# Patient Record
Sex: MALE | Race: WHITE | NOT HISPANIC OR LATINO | Employment: STUDENT | URBAN - METROPOLITAN AREA
[De-identification: names, ages, dates, MRNs, and addresses within clinical notes are randomized per-mention and may not be internally consistent; named-entity substitution may affect disease eponyms.]

---

## 2020-10-27 ENCOUNTER — TELEPHONE (OUTPATIENT)
Dept: INTERNAL MEDICINE CLINIC | Facility: CLINIC | Age: 35
End: 2020-10-27

## 2020-10-27 ENCOUNTER — OFFICE VISIT (OUTPATIENT)
Dept: INTERNAL MEDICINE CLINIC | Facility: CLINIC | Age: 35
End: 2020-10-27
Payer: COMMERCIAL

## 2020-10-27 VITALS
DIASTOLIC BLOOD PRESSURE: 80 MMHG | HEART RATE: 83 BPM | HEIGHT: 71 IN | WEIGHT: 191 LBS | TEMPERATURE: 98.3 F | BODY MASS INDEX: 26.74 KG/M2 | SYSTOLIC BLOOD PRESSURE: 132 MMHG | OXYGEN SATURATION: 98 %

## 2020-10-27 DIAGNOSIS — Z98.890 HISTORY OF FASCIOTOMY: ICD-10-CM

## 2020-10-27 DIAGNOSIS — R39.11 BENIGN PROSTATIC HYPERPLASIA WITH URINARY HESITANCY: ICD-10-CM

## 2020-10-27 DIAGNOSIS — F90.0 ATTENTION DEFICIT HYPERACTIVITY DISORDER (ADHD), PREDOMINANTLY INATTENTIVE TYPE: Primary | ICD-10-CM

## 2020-10-27 DIAGNOSIS — N52.9 ERECTILE DYSFUNCTION, UNSPECIFIED ERECTILE DYSFUNCTION TYPE: ICD-10-CM

## 2020-10-27 DIAGNOSIS — M25.662 JOINT STIFFNESS OF LEFT LOWER LEG: ICD-10-CM

## 2020-10-27 DIAGNOSIS — Z13.220 LIPID SCREENING: ICD-10-CM

## 2020-10-27 DIAGNOSIS — N40.1 BENIGN PROSTATIC HYPERPLASIA WITH URINARY HESITANCY: ICD-10-CM

## 2020-10-27 DIAGNOSIS — F32.9 MAJOR DEPRESSIVE DISORDER, REMISSION STATUS UNSPECIFIED, UNSPECIFIED WHETHER RECURRENT: ICD-10-CM

## 2020-10-27 PROCEDURE — 3008F BODY MASS INDEX DOCD: CPT | Performed by: INTERNAL MEDICINE

## 2020-10-27 PROCEDURE — 3725F SCREEN DEPRESSION PERFORMED: CPT | Performed by: INTERNAL MEDICINE

## 2020-10-27 PROCEDURE — 99203 OFFICE O/P NEW LOW 30 MIN: CPT | Performed by: INTERNAL MEDICINE

## 2020-10-27 RX ORDER — TADALAFIL 5 MG/1
5 TABLET ORAL DAILY
Qty: 90 TABLET | Refills: 0 | Status: SHIPPED | OUTPATIENT
Start: 2020-10-27 | End: 2020-10-29 | Stop reason: SDUPTHER

## 2020-10-27 RX ORDER — DEXTROAMPHETAMINE SACCHARATE, AMPHETAMINE ASPARTATE, DEXTROAMPHETAMINE SULFATE AND AMPHETAMINE SULFATE 7.5; 7.5; 7.5; 7.5 MG/1; MG/1; MG/1; MG/1
30 TABLET ORAL DAILY
COMMUNITY
End: 2020-10-27 | Stop reason: SDUPTHER

## 2020-10-27 RX ORDER — FLUOXETINE HYDROCHLORIDE 40 MG/1
80 CAPSULE ORAL DAILY
Qty: 60 CAPSULE | Refills: 1 | Status: SHIPPED | OUTPATIENT
Start: 2020-10-27 | End: 2021-03-31 | Stop reason: SDUPTHER

## 2020-10-27 RX ORDER — TADALAFIL 5 MG/1
5 TABLET ORAL DAILY PRN
Qty: 90 TABLET | Refills: 0 | Status: SHIPPED | OUTPATIENT
Start: 2020-10-27 | End: 2020-10-27 | Stop reason: SDUPTHER

## 2020-10-27 RX ORDER — TADALAFIL 5 MG/1
5 TABLET ORAL DAILY PRN
COMMUNITY
End: 2020-10-27 | Stop reason: SDUPTHER

## 2020-10-27 RX ORDER — FLUOXETINE HYDROCHLORIDE 40 MG/1
40 CAPSULE ORAL DAILY
COMMUNITY
End: 2020-10-27 | Stop reason: SDUPTHER

## 2020-10-27 RX ORDER — DEXTROAMPHETAMINE SACCHARATE, AMPHETAMINE ASPARTATE, DEXTROAMPHETAMINE SULFATE AND AMPHETAMINE SULFATE 7.5; 7.5; 7.5; 7.5 MG/1; MG/1; MG/1; MG/1
30 TABLET ORAL DAILY
Qty: 30 TABLET | Refills: 0 | Status: SHIPPED | OUTPATIENT
Start: 2020-10-27 | End: 2020-12-01 | Stop reason: SDUPTHER

## 2020-10-29 RX ORDER — TADALAFIL 5 MG/1
5 TABLET ORAL DAILY
Qty: 90 TABLET | Refills: 0 | Status: SHIPPED | OUTPATIENT
Start: 2020-10-29 | End: 2020-11-09 | Stop reason: SDUPTHER

## 2020-11-03 ENCOUNTER — EVALUATION (OUTPATIENT)
Dept: PHYSICAL THERAPY | Facility: REHABILITATION | Age: 35
End: 2020-11-03
Payer: COMMERCIAL

## 2020-11-03 DIAGNOSIS — M25.662 JOINT STIFFNESS OF LEFT LOWER LEG: ICD-10-CM

## 2020-11-03 PROCEDURE — 97161 PT EVAL LOW COMPLEX 20 MIN: CPT | Performed by: PHYSICAL THERAPIST

## 2020-11-05 ENCOUNTER — TELEPHONE (OUTPATIENT)
Dept: INTERNAL MEDICINE CLINIC | Facility: CLINIC | Age: 35
End: 2020-11-05

## 2020-11-05 ENCOUNTER — TELEPHONE (OUTPATIENT)
Dept: PSYCHIATRY | Facility: CLINIC | Age: 35
End: 2020-11-05

## 2020-11-05 DIAGNOSIS — N40.1 BENIGN NON-NODULAR PROSTATIC HYPERPLASIA WITH LOWER URINARY TRACT SYMPTOMS: Primary | ICD-10-CM

## 2020-11-05 DIAGNOSIS — R39.11 BENIGN PROSTATIC HYPERPLASIA WITH URINARY HESITANCY: ICD-10-CM

## 2020-11-05 DIAGNOSIS — N40.1 BENIGN PROSTATIC HYPERPLASIA WITH URINARY HESITANCY: ICD-10-CM

## 2020-11-06 ENCOUNTER — TELEPHONE (OUTPATIENT)
Dept: PSYCHIATRY | Facility: CLINIC | Age: 35
End: 2020-11-06

## 2020-11-09 ENCOUNTER — EVALUATION (OUTPATIENT)
Dept: PHYSICAL THERAPY | Facility: OTHER | Age: 35
End: 2020-11-09
Payer: COMMERCIAL

## 2020-11-09 DIAGNOSIS — M25.662 JOINT STIFFNESS OF LEFT LOWER LEG: Primary | ICD-10-CM

## 2020-11-09 PROCEDURE — 97140 MANUAL THERAPY 1/> REGIONS: CPT | Performed by: PHYSICAL THERAPIST

## 2020-11-09 PROCEDURE — 97110 THERAPEUTIC EXERCISES: CPT | Performed by: PHYSICAL THERAPIST

## 2020-11-09 RX ORDER — TADALAFIL 5 MG/1
5 TABLET ORAL DAILY
Qty: 90 TABLET | Refills: 0 | Status: SHIPPED | OUTPATIENT
Start: 2020-11-09 | End: 2020-12-24 | Stop reason: SDUPTHER

## 2020-11-10 ENCOUNTER — TELEPHONE (OUTPATIENT)
Dept: INTERNAL MEDICINE CLINIC | Facility: CLINIC | Age: 35
End: 2020-11-10

## 2020-11-10 DIAGNOSIS — N40.1 BENIGN NON-NODULAR PROSTATIC HYPERPLASIA WITH LOWER URINARY TRACT SYMPTOMS: ICD-10-CM

## 2020-11-10 DIAGNOSIS — N40.1 BENIGN PROSTATIC HYPERPLASIA WITH URINARY HESITANCY: Primary | ICD-10-CM

## 2020-11-10 DIAGNOSIS — N52.9 ERECTILE DYSFUNCTION, UNSPECIFIED ERECTILE DYSFUNCTION TYPE: ICD-10-CM

## 2020-11-10 DIAGNOSIS — R39.11 BENIGN PROSTATIC HYPERPLASIA WITH URINARY HESITANCY: Primary | ICD-10-CM

## 2020-11-12 ENCOUNTER — OFFICE VISIT (OUTPATIENT)
Dept: PHYSICAL THERAPY | Facility: OTHER | Age: 35
End: 2020-11-12
Payer: COMMERCIAL

## 2020-11-12 DIAGNOSIS — M25.662 JOINT STIFFNESS OF LEFT LOWER LEG: Primary | ICD-10-CM

## 2020-11-12 PROCEDURE — 97140 MANUAL THERAPY 1/> REGIONS: CPT | Performed by: PHYSICAL THERAPIST

## 2020-11-12 PROCEDURE — 97112 NEUROMUSCULAR REEDUCATION: CPT | Performed by: PHYSICAL THERAPIST

## 2020-11-12 PROCEDURE — 97110 THERAPEUTIC EXERCISES: CPT | Performed by: PHYSICAL THERAPIST

## 2020-11-16 ENCOUNTER — APPOINTMENT (OUTPATIENT)
Dept: PHYSICAL THERAPY | Facility: OTHER | Age: 35
End: 2020-11-16
Payer: COMMERCIAL

## 2020-11-19 ENCOUNTER — APPOINTMENT (OUTPATIENT)
Dept: PHYSICAL THERAPY | Facility: OTHER | Age: 35
End: 2020-11-19
Payer: COMMERCIAL

## 2020-11-23 ENCOUNTER — APPOINTMENT (OUTPATIENT)
Dept: PHYSICAL THERAPY | Facility: OTHER | Age: 35
End: 2020-11-23
Payer: COMMERCIAL

## 2020-12-01 DIAGNOSIS — F90.0 ATTENTION DEFICIT HYPERACTIVITY DISORDER (ADHD), PREDOMINANTLY INATTENTIVE TYPE: ICD-10-CM

## 2020-12-01 RX ORDER — DEXTROAMPHETAMINE SACCHARATE, AMPHETAMINE ASPARTATE, DEXTROAMPHETAMINE SULFATE AND AMPHETAMINE SULFATE 7.5; 7.5; 7.5; 7.5 MG/1; MG/1; MG/1; MG/1
30 TABLET ORAL DAILY
Qty: 30 TABLET | Refills: 0 | Status: SHIPPED | OUTPATIENT
Start: 2020-12-01 | End: 2020-12-24 | Stop reason: SDUPTHER

## 2020-12-24 DIAGNOSIS — N40.1 BENIGN NON-NODULAR PROSTATIC HYPERPLASIA WITH LOWER URINARY TRACT SYMPTOMS: ICD-10-CM

## 2020-12-24 DIAGNOSIS — F90.0 ATTENTION DEFICIT HYPERACTIVITY DISORDER (ADHD), PREDOMINANTLY INATTENTIVE TYPE: ICD-10-CM

## 2020-12-29 DIAGNOSIS — F90.0 ATTENTION DEFICIT HYPERACTIVITY DISORDER (ADHD), PREDOMINANTLY INATTENTIVE TYPE: ICD-10-CM

## 2020-12-29 RX ORDER — DEXTROAMPHETAMINE SACCHARATE, AMPHETAMINE ASPARTATE, DEXTROAMPHETAMINE SULFATE AND AMPHETAMINE SULFATE 7.5; 7.5; 7.5; 7.5 MG/1; MG/1; MG/1; MG/1
30 TABLET ORAL DAILY
Qty: 30 TABLET | Refills: 0 | Status: SHIPPED | OUTPATIENT
Start: 2020-12-29 | End: 2021-01-19 | Stop reason: SDUPTHER

## 2020-12-29 RX ORDER — DEXTROAMPHETAMINE SACCHARATE, AMPHETAMINE ASPARTATE, DEXTROAMPHETAMINE SULFATE AND AMPHETAMINE SULFATE 7.5; 7.5; 7.5; 7.5 MG/1; MG/1; MG/1; MG/1
30 TABLET ORAL DAILY
Qty: 30 TABLET | Refills: 0 | Status: CANCELLED | OUTPATIENT
Start: 2020-12-29

## 2020-12-29 RX ORDER — TADALAFIL 5 MG/1
5 TABLET ORAL DAILY
Qty: 90 TABLET | Refills: 1 | Status: SHIPPED | OUTPATIENT
Start: 2020-12-29 | End: 2021-01-05 | Stop reason: SDUPTHER

## 2021-01-05 DIAGNOSIS — N40.1 BENIGN NON-NODULAR PROSTATIC HYPERPLASIA WITH LOWER URINARY TRACT SYMPTOMS: ICD-10-CM

## 2021-01-05 RX ORDER — TADALAFIL 5 MG/1
5 TABLET ORAL DAILY
Qty: 90 TABLET | Refills: 1 | Status: SHIPPED | OUTPATIENT
Start: 2021-01-05 | End: 2021-03-04 | Stop reason: SDUPTHER
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
## 2021-01-19 ENCOUNTER — OFFICE VISIT (OUTPATIENT)
Dept: PSYCHIATRY | Facility: CLINIC | Age: 36
End: 2021-01-19
Payer: COMMERCIAL

## 2021-01-19 ENCOUNTER — TELEPHONE (OUTPATIENT)
Dept: PSYCHIATRY | Facility: CLINIC | Age: 36
End: 2021-01-19

## 2021-01-19 VITALS — WEIGHT: 186.2 LBS | HEIGHT: 71 IN | BODY MASS INDEX: 26.07 KG/M2

## 2021-01-19 DIAGNOSIS — F90.0 ATTENTION DEFICIT HYPERACTIVITY DISORDER (ADHD), PREDOMINANTLY INATTENTIVE TYPE: Primary | ICD-10-CM

## 2021-01-19 DIAGNOSIS — F32.9 MAJOR DEPRESSIVE DISORDER, REMISSION STATUS UNSPECIFIED, UNSPECIFIED WHETHER RECURRENT: ICD-10-CM

## 2021-01-19 PROCEDURE — 90791 PSYCH DIAGNOSTIC EVALUATION: CPT | Performed by: STUDENT IN AN ORGANIZED HEALTH CARE EDUCATION/TRAINING PROGRAM

## 2021-01-19 RX ORDER — TRAZODONE HYDROCHLORIDE 50 MG/1
50 TABLET ORAL
Qty: 30 TABLET | Refills: 1 | Status: SHIPPED | OUTPATIENT
Start: 2021-01-19 | End: 2021-03-26

## 2021-01-19 RX ORDER — DEXTROAMPHETAMINE SACCHARATE, AMPHETAMINE ASPARTATE, DEXTROAMPHETAMINE SULFATE AND AMPHETAMINE SULFATE 7.5; 7.5; 7.5; 7.5 MG/1; MG/1; MG/1; MG/1
30 TABLET ORAL DAILY
Qty: 30 TABLET | Refills: 0 | Status: SHIPPED | OUTPATIENT
Start: 2021-01-19 | End: 2021-02-17 | Stop reason: SDUPTHER

## 2021-01-19 NOTE — TELEPHONE ENCOUNTER
Patient called wanting to l/m for you  The referral you gave him today [place] for [address] [place] [address] does not take his insurance  He is wondering if you could recommend somewhere else    Please call [phone]

## 2021-01-19 NOTE — BH TREATMENT PLAN
TREATMENT PLAN (Medication Management Only)        [place]    Name and Date of Birth: Prachi Wilcox 38 y o  1985  Date of Treatment Plan: January 19, 2021  Diagnosis/Diagnoses:    1  Attention deficit hyperactivity disorder (ADHD), predominantly inattentive type    2  Major depressive disorder, remission status unspecified, unspecified whether recurrent      Strengths/Personal Resources for Self-Care: "girl-friend"  Area/Areas of need (in own words): depression, ADHD symptoms  1  Long Term Goal: maintain control of ADHD symptoms  Target Date:4 months - 5/19/2021  Person/Persons responsible for completion of goal: Herbert  2  Short Term Objective (s) - How will we reach this goal?:   A  Provider new recommended medication/dosage changes and/or continue medication(s): continue current medications as prescribed  B  referred for therapy  C  N/A  Target Date:4 months - 5/19/2021  Person/Persons Responsible for Completion of Goal: Herbert  Progress Towards Goals: initiating treatment  Treatment Modality: medication management every 4 weeks  Review due 180 days from date of this plan: 4 months - 5/19/2021  Expected length of service: maintenance  My Physician/PA/NP and I have developed this plan together and I agree to work on the goals and objectives  I understand the treatment goals that were developed for my treatment
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
## 2021-02-17 ENCOUNTER — TELEMEDICINE (OUTPATIENT)
Dept: PSYCHIATRY | Facility: CLINIC | Age: 36
End: 2021-02-17
Payer: COMMERCIAL

## 2021-02-17 DIAGNOSIS — F32.9 MAJOR DEPRESSIVE DISORDER, REMISSION STATUS UNSPECIFIED, UNSPECIFIED WHETHER RECURRENT: ICD-10-CM

## 2021-02-17 DIAGNOSIS — F90.0 ATTENTION DEFICIT HYPERACTIVITY DISORDER (ADHD), PREDOMINANTLY INATTENTIVE TYPE: Primary | ICD-10-CM

## 2021-02-17 PROCEDURE — 99213 OFFICE O/P EST LOW 20 MIN: CPT | Performed by: STUDENT IN AN ORGANIZED HEALTH CARE EDUCATION/TRAINING PROGRAM

## 2021-02-17 RX ORDER — BUPROPION HYDROCHLORIDE 150 MG/1
150 TABLET ORAL DAILY
Qty: 30 TABLET | Refills: 1 | Status: SHIPPED | OUTPATIENT
Start: 2021-02-17 | End: 2021-03-31 | Stop reason: ALTCHOICE

## 2021-02-17 RX ORDER — DEXTROAMPHETAMINE SACCHARATE, AMPHETAMINE ASPARTATE, DEXTROAMPHETAMINE SULFATE AND AMPHETAMINE SULFATE 7.5; 7.5; 7.5; 7.5 MG/1; MG/1; MG/1; MG/1
30 TABLET ORAL DAILY
Qty: 30 TABLET | Refills: 0 | Status: SHIPPED | OUTPATIENT
Start: 2021-02-17 | End: 2021-03-23 | Stop reason: SDUPTHER

## 2021-03-01 ENCOUNTER — PATIENT MESSAGE (OUTPATIENT)
Dept: INTERNAL MEDICINE CLINIC | Facility: CLINIC | Age: 36
End: 2021-03-01

## 2021-03-01 DIAGNOSIS — N40.1 BENIGN NON-NODULAR PROSTATIC HYPERPLASIA WITH LOWER URINARY TRACT SYMPTOMS: ICD-10-CM

## 2021-03-04 RX ORDER — TADALAFIL 20 MG/1
20 TABLET ORAL DAILY PRN
Qty: 30 TABLET | Refills: 3 | Status: SHIPPED | OUTPATIENT
Start: 2021-03-04

## 2021-03-23 DIAGNOSIS — F90.0 ATTENTION DEFICIT HYPERACTIVITY DISORDER (ADHD), PREDOMINANTLY INATTENTIVE TYPE: ICD-10-CM

## 2021-03-23 RX ORDER — DEXTROAMPHETAMINE SACCHARATE, AMPHETAMINE ASPARTATE, DEXTROAMPHETAMINE SULFATE AND AMPHETAMINE SULFATE 7.5; 7.5; 7.5; 7.5 MG/1; MG/1; MG/1; MG/1
30 TABLET ORAL DAILY
Qty: 30 TABLET | Refills: 0 | Status: SHIPPED | OUTPATIENT
Start: 2021-03-23 | End: 2021-03-26 | Stop reason: SDUPTHER

## 2021-03-24 ENCOUNTER — OFFICE VISIT (OUTPATIENT)
Dept: INTERNAL MEDICINE CLINIC | Facility: CLINIC | Age: 36
End: 2021-03-24
Payer: COMMERCIAL

## 2021-03-24 ENCOUNTER — APPOINTMENT (OUTPATIENT)
Dept: RADIOLOGY | Age: 36
End: 2021-03-24
Payer: COMMERCIAL

## 2021-03-24 VITALS
HEIGHT: 71 IN | SYSTOLIC BLOOD PRESSURE: 138 MMHG | WEIGHT: 191.2 LBS | OXYGEN SATURATION: 97 % | TEMPERATURE: 99.3 F | DIASTOLIC BLOOD PRESSURE: 78 MMHG | BODY MASS INDEX: 26.77 KG/M2 | HEART RATE: 85 BPM

## 2021-03-24 DIAGNOSIS — M54.2 CERVICAL PAIN (NECK): ICD-10-CM

## 2021-03-24 DIAGNOSIS — M54.2 CERVICAL PAIN (NECK): Primary | ICD-10-CM

## 2021-03-24 PROCEDURE — 3008F BODY MASS INDEX DOCD: CPT | Performed by: INTERNAL MEDICINE

## 2021-03-24 PROCEDURE — 99214 OFFICE O/P EST MOD 30 MIN: CPT | Performed by: INTERNAL MEDICINE

## 2021-03-24 PROCEDURE — 72050 X-RAY EXAM NECK SPINE 4/5VWS: CPT

## 2021-03-24 PROCEDURE — 1036F TOBACCO NON-USER: CPT | Performed by: INTERNAL MEDICINE

## 2021-03-24 RX ORDER — METHOCARBAMOL 500 MG/1
500 TABLET, FILM COATED ORAL
Qty: 30 TABLET | Refills: 0 | Status: SHIPPED | OUTPATIENT
Start: 2021-03-24 | End: 2021-04-23 | Stop reason: SDUPTHER
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
## 2021-03-24 NOTE — ASSESSMENT & PLAN NOTE
Will prescribe methocarbamol 500 mg to be taken at bedtime for muscle spasms  Continue Tylenol/NSAIDs as needed for pain  Will follow-up an x-ray of the cervical spine and refer to Physical therapy for further evaluation

## 2021-03-26 DIAGNOSIS — F32.9 MAJOR DEPRESSIVE DISORDER, REMISSION STATUS UNSPECIFIED, UNSPECIFIED WHETHER RECURRENT: ICD-10-CM

## 2021-03-26 RX ORDER — TRAZODONE HYDROCHLORIDE 50 MG/1
TABLET ORAL
Qty: 30 TABLET | Refills: 1 | Status: SHIPPED | OUTPATIENT
Start: 2021-03-26 | End: 2021-03-31 | Stop reason: SDUPTHER

## 2021-03-26 RX ORDER — DEXTROAMPHETAMINE SACCHARATE, AMPHETAMINE ASPARTATE, DEXTROAMPHETAMINE SULFATE AND AMPHETAMINE SULFATE 7.5; 7.5; 7.5; 7.5 MG/1; MG/1; MG/1; MG/1
30 TABLET ORAL DAILY
Qty: 30 TABLET | Refills: 0 | Status: SHIPPED | OUTPATIENT
Start: 2021-03-26 | End: 2021-03-31 | Stop reason: DRUGHIGH

## 2021-03-28 NOTE — PSYCH
Virtual Regular Visit    Assessment/Plan:    Problem List Items Addressed This Visit        Other    Attention deficit hyperactivity disorder (ADHD), predominantly inattentive type - Primary    Relevant Medications    FLUoxetine (PROzac) 40 MG capsule    lisdexamfetamine (VYVANSE) 60 MG capsule (Start on 4/26/2021)    amphetamine-dextroamphetamine (ADDERALL) 20 mg tablet (Start on 4/26/2021)    traZODone (DESYREL) 50 mg tablet    Major depressive disorder    Relevant Medications    FLUoxetine (PROzac) 40 MG capsule    lisdexamfetamine (VYVANSE) 60 MG capsule (Start on 4/26/2021)    amphetamine-dextroamphetamine (ADDERALL) 20 mg tablet (Start on 4/26/2021)    traZODone (DESYREL) 50 mg tablet               Reason for visit is   Chief Complaint   Patient presents with    Medication Management    ADHD    Depression        Encounter provider Thai Gama MD    Provider located at: [facility] 14  [facility] 22  [facility] 3    Recent Visits  Date Type Provider Dept   03/24/21 Office Visit Venkat De La O MD [facility] recent visits within past 7 days and meeting all other requirements     Today's Visits  Date Type Provider Dept   03/31/21 Telemedicine Thai Gama MD [facility] 18 today's visits and meeting all other requirements     Future Appointments  No visits were found meeting these conditions  Showing future appointments within next 150 days and meeting all other requirements        The patient was identified by name and date of birth  Mini Bearden was informed that this is a telemedicine visit and that the visit is being conducted through [employer] and patient was informed that this is a secure, HIPAA-compliant platform  He agrees to proceed  My office door was closed  No one else was in the room    He acknowledged consent and understanding of privacy and security of the video platform  The patient has agreed to participate and understands they can discontinue the visit at any time  Patient is aware this is a billable service  MEDICATION MANAGEMENT NOTE        [facility]      Name and Date of Birth: Autumn Burt 43 y o  1985 MRN: [id number]    Date of Visit: March 31, 2021    Reason for Visit:   Chief Complaint   Patient presents with    Medication Management    ADHD    Depression       SUBJECTIVE:  The patient was visited virtually for medication management and follow up visit for depression and ADHD  Presented calm, and cooperative  Reported feeling "ok"  He stated that 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 [employer] in [facility], and will move to [facility] in [place] first  He c/o neck pain and Xray showed some DJD changes  Scored his pain up to 8/10, triggered mostly by moving around  Denied any changes in sleep, appetite, concentration, energy level, or daily activities  Denied feelings of anhedonia, hopelessness, helplessness, worthlessness or guilt and appeared to be future oriented  There was no thought constriction related to death  Denied SI/HI, intent or plan upon direct inquiry at this time  Denied AV/H  No anxiety sxs, specific phobia or panic attacks reported  No manic sxs, paranoid ideations or fixed delusions were elicited  Endorsed good compliance with the medications  Continues to report sexual side effects w/ Prozac, and noted that adding Wellbutrin was not helpful  Denied any other side effects  Denied smoking cigarettes, binge drinking alcohol or other illicit substance use  Given this presentation, Wellbutrin discontinues, Prozac and Vyvanse maintained at the same dose and Adderall decreased from 30 mg to 20 mg to be tapered off as tolerated  Pending therapy  Pending blood work as per PCP   The patient was educated to call 911 or go to the nearest emergency room if the symptoms become overwhelming or unable to remain in control  Verbalized understanding and agreed to seek help in case of distress or concern for safety  Review Of Systems:  Pertinent items are noted in HPI; all others are negative; no recent changes in medications or health status reported  PHQ-9 Depression Screening    PHQ-9:   Frequency of the following problems over the past two weeks:      Little interest or pleasure in doing things: 1 - several days  Feeling down, depressed, or hopeless: 1 - several days  Trouble falling or staying asleep, or sleeping too much: 1 - several days  Feeling tired or having little energy: 1 - several days  Poor appetite or overeatin - several days  Feeling bad about yourself - or that you are a failure or have let yourself or your family down: 1 - several days  Trouble concentrating on things, such as reading the newspaper or watching television: 1 - several days  Moving or speaking so slowly that other people could have noticed  Or the opposite - being so fidgety or restless that you have been moving around a lot more than usual: 0 - not at all  Thoughts that you would be better off dead, or of hurting yourself in some way: 0 - not at all  PHQ-2 Score: 2  PHQ-9 Score: 7         Past Psychiatric History Update:   - No inpatient psychiatric admission since last encounter  - No SA or SIB since last encounter  - No incidence of violent behavior since last encounter    Past Trauma History Update:    - No new onset of abuse or traumatic events since last encounter     Past Medical History:    Past Medical History:   Diagnosis Date    Benign non-nodular prostatic hyperplasia with lower urinary tract symptoms      No past medical history pertinent negatives    Past Surgical History:   Procedure Laterality Date    WISDOM TOOTH EXTRACTION       No Known Allergies    Substance Abuse History:    Social History     Substance and Sexual Activity   Alcohol Use Yes    Frequency: 2-3 times a week     Social History Substance and Sexual Activity   Drug Use Never       Social History:    Social History     Socioeconomic History    Marital status: Single     Spouse name: Not on file    Number of children: Not on file    Years of education: Not on file    Highest education level: Not on file   Occupational History    Not on file   Social Needs    Financial resource strain: Not on file    Food insecurity     Worry: Not on file     Inability: Not on file    Transportation needs     Medical: Not on file     Non-medical: Not on file   Tobacco Use    Smoking status: Never Smoker    Smokeless tobacco: Never Used   Substance and Sexual Activity    Alcohol use: Yes     Frequency: 2-3 times a week    Drug use: Never    Sexual activity: Yes   Lifestyle    Physical activity     Days per week: Not on file     Minutes per session: Not on file    Stress: Not on file   Relationships    Social connections     Talks on phone: Not on file     Gets together: Not on file     Attends Alevism service: Not on file     Active member of club or organization: Not on file     Attends meetings of clubs or organizations: Not on file     Relationship status: Not on file    Intimate partner violence     Fear of current or ex partner: Not on file     Emotionally abused: Not on file     Physically abused: Not on file     Forced sexual activity: Not on file   Other Topics Concern    Not on file   Social History Narrative    Not on file       Family Psychiatric History:     Family History   Problem Relation Age of Onset    No Known Problems Mother     No Known Problems Father        History Review:  The following portions of the patient's history were reviewed and updated as appropriate: allergies, current medications, past family history, past medical history, past social history, past surgical history and problem list        OBJECTIVE:     Vital signs in last 24 hours:    Vitals:       Mental Status Evaluation:  Appearance and attitude: appeared as stated age, cooperative and attentive, casually dressed with good hygiene, wearing eye-glasses  Eye contact: good  Motor Function: within normal limits, No PMA/PMR  Gait/station: Not observed  Speech: normal for rate, rhythm, volume, latency, amount  Language: No overt abnormality  Mood/affect: euthymic / Affect was constricted but reactive, mood congruent  Thought Processes: sequential and goal-directed  Thought content: denies suicidal ideation or homicidal ideation; no delusions or first rank symptoms  Associations: intact associations  Perceptual disturbances: denies Auditory/Visual/Tactile Hallucinations  Orientation: oriented to time, person, place and to the situational context  Cognitive Function: intact  Memory: intact short-term and long-term  Intellect: average  Fund of knowledge: aware of current events, aware of past history and vocabulary average  Impulse control: good  Insight/judgment: good/good    Pain: reported having pain in neck  Pain scale: up to 8/10    Laboratory Results: I have personally reviewed all pertinent laboratory/tests results    Recent Labs (last 2 months):   No visits with results within 2 Month(s) from this visit  Latest known visit with results is:   No results found for any previous visit  Assessment/Plan:   Juancho Aguilar y o   male,  (7 y/o daughter in joint custody and 26 y/o son on [place] living w/ his mother), domiciled alone and 10 days/month w/ her daughter, 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 [employer] PMH of BPH, erectile dysfunction, h/o fasciotomy, joint stiffness of LLE and PPH of ADHD and MDD, no prior psychiatric admissions, no prior SA, no h/o self-injurious behavior, on Prozac 80 mg, Vyvanse 60 mg and Adderall 30 mg who presented to the mental health clinic for the initial intake and psychiatric evaluation on 1/19/2021   Presented w/ ADHD sxs well controlled with current regimen over past few years, and h/o MDD in remission  Denied SI/HI, intent or plan upon direct inquiry at this time  PHQ-9: 4; SUKH-7: 4  His current presentation meets criteria for ADHD and MDD, in remission  Maintained on the same medication regimen, and started on Trazodone PRN for sleep  Referred for individual therapy  The patient presented w/ stable mood and ADHD sxs, and c/o sexual side effects w/ Prozac (was started on Wellbutrin  mg po daily to counteract sexual side effects whic reportedly was not helpful and discontinued), Vyvanse maintained the same dose and Adderall tapered down from 30 mg to 20 mg  Pending blood work and therapy  Diagnoses and all orders for this visit:    Attention deficit hyperactivity disorder (ADHD), predominantly inattentive type  -     FLUoxetine (PROzac) 40 MG capsule; Take 2 capsules (80 mg total) by mouth daily  -     lisdexamfetamine (VYVANSE) 60 MG capsule; Take 1 capsule (60 mg total) by mouth every morningMax Daily Amount: 60 mg  -     amphetamine-dextroamphetamine (ADDERALL) 20 mg tablet; Take 1 tablet (20 mg total) by mouth dailyMax Daily Amount: 20 mg    Major depressive disorder, remission status unspecified, unspecified whether recurrent  -     FLUoxetine (PROzac) 40 MG capsule; Take 2 capsules (80 mg total) by mouth daily  -     traZODone (DESYREL) 50 mg tablet; Take 1 tablet (50 mg total) by mouth daily at bedtime          Impression:  1  Attention deficit hyperactivity disorder (ADHD), predominantly inattentive type  FLUoxetine (PROzac) 40 MG capsule    lisdexamfetamine (VYVANSE) 60 MG capsule    amphetamine-dextroamphetamine (ADDERALL) 20 mg tablet   2   Major depressive disorder, remission status unspecified, unspecified whether recurrent  FLUoxetine (PROzac) 40 MG capsule    traZODone (DESYREL) 50 mg tablet       Treatment Recommendations/Precautions:  - f/u labs as per PCP  - Continue Vyvanse 60 mg for ADHD  - Decrease Adderall 30 mg to 20 mg po daily in the afternoon for ADHD; to be tapered off as tolerated  - Continue Prozac 80 mg for depression  - Continue Trazodone 50 mg po nightly for insomnia  - Stop Wellbutrin  - Medications sent to patient's pharmacy for 30 day supply  - Pending therapy  - Psychoeducation provided to the patient and benefits, potential risks and side effects discussed; importance of compliance with the psychiatric treatment reiterated, and the patient verbalized understanding of the matter     - RTC in 8 weeks  - Educated about healthy life style, risk of falls/sedation and addiction  Patient was receptive to education   - The patient was educated about 24 hour and weekend coverage for urgent situations accessed by calling [facility] practice number  - Patient was educated to call [address] [facility] ([phone] [[id number]]) for behavioral crisis at anytime or 911 for any safety concerns, or go to nearest ER if his symptoms become overwhelming or unmanageable  Current Outpatient Medications   Medication Sig Dispense Refill    [START ON 4/26/2021] amphetamine-dextroamphetamine (ADDERALL) 20 mg tablet Take 1 tablet (20 mg total) by mouth dailyMax Daily Amount: 20 mg 30 tablet 0    FLUoxetine (PROzac) 40 MG capsule Take 2 capsules (80 mg total) by mouth daily 60 capsule 1    [START ON 4/26/2021] lisdexamfetamine (VYVANSE) 60 MG capsule Take 1 capsule (60 mg total) by mouth every morningMax Daily Amount: 60 mg 30 capsule 0    methocarbamol (ROBAXIN) 500 mg tablet Take 1 tablet (500 mg total) by mouth daily at bedtime as needed for muscle spasms 30 tablet 0    tadalafil (CIALIS) 20 MG tablet Take 1 tablet (20 mg total) by mouth daily as needed for erectile dysfunction 30 tablet 3    traZODone (DESYREL) 50 mg tablet Take 1 tablet (50 mg total) by mouth daily at bedtime 30 tablet 1     No current facility-administered medications for this visit            Medications Risks/Benefits      Risks, Benefits And Possible Side Effects Of Medications:    Risks, benefits, and possible side effects of medications explained to Herbert and he verbalizes understanding and agreement for treatment  Controlled Medication Discussion:     Tomasz Milan has been filling controlled prescriptions on time as prescribed according to [address] Monitoring Program    Psychotherapy Provided:     Individual psychotherapy provided: Yes  Counseling was provided during the session today for 16 minutes  Psychoeducation provided to the patient and was educated about the importance of compliance with the medications and psychiatric treatment  Supportive psychotherapy provided to the patient  Solution Focused Brief Therapy (SFBT) provided  Patient's emotions were validated and specific labeled praise provided  [place] suggestions were offered in a supportive non-critical way       Treatment Plan:    Completed and signed during the session: Not applicable - Treatment Plan not due at this session    Hiren Martin MD 03/31/21

## 2021-03-31 ENCOUNTER — TELEMEDICINE (OUTPATIENT)
Dept: PSYCHIATRY | Facility: CLINIC | Age: 36
End: 2021-03-31
Payer: COMMERCIAL

## 2021-03-31 DIAGNOSIS — F90.0 ATTENTION DEFICIT HYPERACTIVITY DISORDER (ADHD), PREDOMINANTLY INATTENTIVE TYPE: Primary | ICD-10-CM

## 2021-03-31 DIAGNOSIS — F32.9 MAJOR DEPRESSIVE DISORDER, REMISSION STATUS UNSPECIFIED, UNSPECIFIED WHETHER RECURRENT: ICD-10-CM

## 2021-03-31 PROCEDURE — 99213 OFFICE O/P EST LOW 20 MIN: CPT | Performed by: STUDENT IN AN ORGANIZED HEALTH CARE EDUCATION/TRAINING PROGRAM

## 2021-03-31 RX ORDER — DEXTROAMPHETAMINE SACCHARATE, AMPHETAMINE ASPARTATE, DEXTROAMPHETAMINE SULFATE AND AMPHETAMINE SULFATE 5; 5; 5; 5 MG/1; MG/1; MG/1; MG/1
20 TABLET ORAL DAILY
Qty: 30 TABLET | Refills: 0 | Status: SHIPPED | OUTPATIENT
Start: 2021-04-26 | End: 2021-05-25 | Stop reason: SDUPTHER

## 2021-03-31 RX ORDER — TRAZODONE HYDROCHLORIDE 50 MG/1
50 TABLET ORAL
Qty: 30 TABLET | Refills: 1 | Status: SHIPPED | OUTPATIENT
Start: 2021-03-31 | End: 2021-05-25 | Stop reason: SDUPTHER

## 2021-03-31 RX ORDER — FLUOXETINE HYDROCHLORIDE 40 MG/1
80 CAPSULE ORAL DAILY
Qty: 60 CAPSULE | Refills: 1 | Status: SHIPPED | OUTPATIENT
Start: 2021-03-31 | End: 2021-06-18

## 2021-04-01 ENCOUNTER — TELEPHONE (OUTPATIENT)
Dept: INTERNAL MEDICINE CLINIC | Facility: CLINIC | Age: 36
End: 2021-04-01

## 2021-04-01 NOTE — TELEPHONE ENCOUNTER
Herbert called this afternoon and wanted to get a work note excusing him from work for Tuesday 3/30-Thursday 4/1 due to neck pain  He was seen in the office on 03/24/21 for this issue by Dr Manuel Go  He stated that it got worse this past week  Can a note be written for him?

## 2021-04-01 NOTE — LETTER
April 1, 2021     Patient: Diane Flor   YOB: 1985   Date of Visit: 4/1/2021       To Whom it May Concern: Diane Flro is under my professional care  Please excuse him from work for the dates missed from 03/30 until 04/01/2021  If you have any questions or concerns, please don't hesitate to call           Sincerely,        Patty Jacobo MD

## 2021-04-06 ENCOUNTER — APPOINTMENT (EMERGENCY)
Dept: RADIOLOGY | Facility: HOSPITAL | Age: 36
End: 2021-04-06
Payer: COMMERCIAL

## 2021-04-06 ENCOUNTER — TELEPHONE (OUTPATIENT)
Dept: RADIOLOGY | Facility: HOSPITAL | Age: 36
End: 2021-04-06

## 2021-04-06 ENCOUNTER — HOSPITAL ENCOUNTER (EMERGENCY)
Facility: HOSPITAL | Age: 36
Discharge: HOME | End: 2021-04-06
Attending: EMERGENCY MEDICINE | Admitting: EMERGENCY MEDICINE
Payer: COMMERCIAL

## 2021-04-06 VITALS
TEMPERATURE: 98 F | SYSTOLIC BLOOD PRESSURE: 132 MMHG | DIASTOLIC BLOOD PRESSURE: 71 MMHG | BODY MASS INDEX: 26.6 KG/M2 | WEIGHT: 190 LBS | HEIGHT: 71 IN | RESPIRATION RATE: 18 BRPM | HEART RATE: 70 BPM | OXYGEN SATURATION: 98 %

## 2021-04-06 DIAGNOSIS — S22.000A THORACIC COMPRESSION FRACTURE (HCC): Primary | ICD-10-CM

## 2021-04-06 LAB
BACTERIA UR QL AUTO: NORMAL /HPF
BILIRUB UR QL STRIP: NEGATIVE
CLARITY UR: CLEAR
CLARITY, POC: CLEAR
COLOR UR: YELLOW
COLOR, POC: YELLOW
GLUCOSE UR STRIP-MCNC: NEGATIVE MG/DL
HGB UR QL STRIP.AUTO: ABNORMAL
HYALINE CASTS #/AREA URNS LPF: NORMAL /LPF
KETONES UR STRIP-MCNC: NEGATIVE MG/DL
LEUKOCYTE ESTERASE UR QL STRIP: NEGATIVE
NITRITE UR QL STRIP: NEGATIVE
NON-SQ EPI CELLS URNS QL MICRO: NORMAL /HPF
PH UR STRIP.AUTO: 7 [PH] (ref 4.5–8)
PROT UR STRIP-MCNC: NEGATIVE MG/DL
RBC #/AREA URNS AUTO: NORMAL /HPF
SP GR UR STRIP.AUTO: 1.02 (ref 1–1.03)
UROBILINOGEN UR QL STRIP.AUTO: 0.2 E.U./DL
WBC #/AREA URNS AUTO: NORMAL /HPF

## 2021-04-06 PROCEDURE — 99284 EMERGENCY DEPT VISIT MOD MDM: CPT | Performed by: EMERGENCY MEDICINE

## 2021-04-06 PROCEDURE — 81001 URINALYSIS AUTO W/SCOPE: CPT

## 2021-04-06 PROCEDURE — G1004 CDSM NDSC: HCPCS

## 2021-04-06 PROCEDURE — 87591 N.GONORRHOEAE DNA AMP PROB: CPT | Performed by: EMERGENCY MEDICINE

## 2021-04-06 PROCEDURE — 99284 EMERGENCY DEPT VISIT MOD MDM: CPT

## 2021-04-06 PROCEDURE — 87491 CHLMYD TRACH DNA AMP PROBE: CPT | Performed by: EMERGENCY MEDICINE

## 2021-04-06 PROCEDURE — 96372 THER/PROPH/DIAG INJ SC/IM: CPT

## 2021-04-06 PROCEDURE — 72141 MRI NECK SPINE W/O DYE: CPT

## 2021-04-06 RX ORDER — KETOROLAC TROMETHAMINE 30 MG/ML
15 INJECTION, SOLUTION INTRAMUSCULAR; INTRAVENOUS ONCE
Status: COMPLETED | OUTPATIENT
Start: 2021-04-06 | End: 2021-04-06

## 2021-04-06 RX ADMIN — KETOROLAC TROMETHAMINE 15 MG: 30 INJECTION, SOLUTION INTRAMUSCULAR at 11:18

## 2021-04-06 NOTE — ED PROVIDER NOTES
History  Chief Complaint   Patient presents with    Neck Pain     reports neck pain x1 month, had xrays that shows narrowing at C5-6, reprots worsening pain and new tingling and weakness in b/l arms starting last night with new urinary incontinence x1 week  reports injury 7 years ago and another injury 1 year ago  HPI  42 yo male with PMH depression, ADHD, presents to the ED with concern for progressively worsening neck pain x 3 months  Pain is worse with turning his head from side to side  Reports he has also been having intermittent tingling and subjective weakness in his bilateral upper extremities  Does not have this currently, but did have this last night  Pt also concerned that over the last week he has had a few episodes of urinary urgency where he has difficulty getting to the bathroom in time, and also urinary hesitancy where he has difficulty emptying his bladder  Denies saddle anesthesia, overflow incontinence, bowel incontinence, lower extremity weakness/numbness  No fevers, chills, nausea, vomiting, chest pain, SOB, headache, hx IVDU  No recent trauma or heavy lifting  States snowboarding incident and heavy lifting a few years ago after which he had neck pain but nothing recent  Pt was evaluated by his PCP for this neck pain on 3/24 and had XR cervical spine that showed some mild foraminal narrowing at C4-C5  He has an appointment scheduled with physical therapy for next week  Prior to Admission Medications   Prescriptions Last Dose Informant Patient Reported? Taking?    FLUoxetine (PROzac) 40 MG capsule   No Yes   Sig: Take 2 capsules (80 mg total) by mouth daily   amphetamine-dextroamphetamine (ADDERALL) 20 mg tablet   No Yes   Sig: Take 1 tablet (20 mg total) by mouth dailyMax Daily Amount: 20 mg   lisdexamfetamine (VYVANSE) 60 MG capsule   No Yes   Sig: Take 1 capsule (60 mg total) by mouth every morningMax Daily Amount: 60 mg   methocarbamol (ROBAXIN) 500 mg tablet   No Yes   Sig: Take 1 tablet (500 mg total) by mouth daily at bedtime as needed for muscle spasms   tadalafil (CIALIS) 20 MG tablet   No Yes   Sig: Take 1 tablet (20 mg total) by mouth daily as needed for erectile dysfunction   traZODone (DESYREL) 50 mg tablet   No Yes   Sig: Take 1 tablet (50 mg total) by mouth daily at bedtime      Facility-Administered Medications: None       Past Medical History:   Diagnosis Date    Benign non-nodular prostatic hyperplasia with lower urinary tract symptoms        Past Surgical History:   Procedure Laterality Date    WISDOM TOOTH EXTRACTION         Family History   Problem Relation Age of Onset    No Known Problems Mother     No Known Problems Father      I have reviewed and agree with the history as documented  E-Cigarette/Vaping    E-Cigarette Use Never User      E-Cigarette/Vaping Substances     Social History     Tobacco Use    Smoking status: Never Smoker    Smokeless tobacco: Never Used   Substance Use Topics    Alcohol use: Yes     Frequency: 2-3 times a week    Drug use: Never        Review of Systems   Constitutional: Negative for chills and fever  HENT: Negative for congestion, rhinorrhea and sore throat  Respiratory: Negative for cough and shortness of breath  Cardiovascular: Negative for chest pain and palpitations  Gastrointestinal: Negative for abdominal pain, nausea and vomiting  Genitourinary: Positive for difficulty urinating  Negative for dysuria, hematuria and urgency  Musculoskeletal: Positive for arthralgias, back pain, myalgias and neck pain  Negative for gait problem  Skin: Negative for rash  Neurological: Positive for weakness and numbness  Negative for dizziness, light-headedness and headaches  All other systems reviewed and are negative        Physical Exam  ED Triage Vitals   Temperature Pulse Respirations Blood Pressure SpO2   04/06/21 1100 04/06/21 1050 04/06/21 1050 04/06/21 1050 04/06/21 1050   98 °F (36 7 °C) 66 18 132/76 98 %      Temp Source Heart Rate Source Patient Position - Orthostatic VS BP Location FiO2 (%)   04/06/21 1100 04/06/21 1245 04/06/21 1245 04/06/21 1245 --   Oral Monitor Lying Left arm       Pain Score       04/06/21 1050       7             Orthostatic Vital Signs  Vitals:    04/06/21 1050 04/06/21 1245 04/06/21 1430   BP: 132/76 130/73 132/71   Pulse: 66 68 70   Patient Position - Orthostatic VS:  Lying Lying       Physical Exam  Constitutional:       General: He is not in acute distress  Appearance: He is well-developed  He is not diaphoretic  HENT:      Head: Normocephalic and atraumatic  Right Ear: External ear normal       Left Ear: External ear normal    Eyes:      Conjunctiva/sclera: Conjunctivae normal       Pupils: Pupils are equal, round, and reactive to light  Neck:      Musculoskeletal: Normal range of motion and neck supple  Normal range of motion  Spinous process tenderness present  No neck rigidity, crepitus or muscular tenderness  Trachea: Trachea normal      Cardiovascular:      Rate and Rhythm: Normal rate and regular rhythm  Heart sounds: Normal heart sounds  No murmur  No friction rub  No gallop  Pulmonary:      Effort: Pulmonary effort is normal  No respiratory distress  Breath sounds: Normal breath sounds  No wheezing, rhonchi or rales  Abdominal:      General: Bowel sounds are normal  There is no distension  Palpations: Abdomen is soft  Tenderness: There is no abdominal tenderness  Musculoskeletal: Normal range of motion  Cervical back: He exhibits bony tenderness  Thoracic back: He exhibits no bony tenderness  Lumbar back: He exhibits no bony tenderness  Back:    Lymphadenopathy:      Cervical: No cervical adenopathy  Skin:     General: Skin is warm and dry  Neurological:      Mental Status: He is alert and oriented to person, place, and time  Cranial Nerves: Cranial nerves are intact  No cranial nerve deficit        Sensory: Sensation is intact  No sensory deficit  Motor: Motor function is intact  No weakness (5/5 bilateral upper and lower extremities on exam, but pt subjectively feels weak in upper extremities)  ED Medications  Medications   ketorolac (TORADOL) injection 15 mg (15 mg Intramuscular Given 4/6/21 1118)       Diagnostic Studies  Results Reviewed     Procedure Component Value Units Date/Time    Urine Microscopic [[id number]]  (Normal) Collected: 04/06/21 1130    Lab Status: Final result Specimen: Urine, Clean Catch Updated: 04/06/21 1235     RBC, UA 2-4 /hpf      WBC, UA None Seen /hpf      Epithelial Cells None Seen /hpf      Bacteria, UA None Seen /hpf      Hyaline Casts, UA None Seen /lpf     Chlamydia/GC amplified DNA by PCR [[id number]] Collected: 04/06/21 1132    Lab Status: In process Specimen: Urine, Other Updated: 04/06/21 1145    POCT urinalysis dipstick [[id number]]  (Normal) Resulted: 04/06/21 1131    Lab Status: Final result Updated: 04/06/21 1133     Color, UA yellow     Clarity, UA clear    Urine Macroscopic, POC [[id number]]  (Abnormal) Collected: 04/06/21 1130    Lab Status: Final result Specimen: Urine Updated: 04/06/21 1131     Color, UA Yellow     Clarity, UA Clear     pH, UA 7 0     Leukocytes, UA Negative     Nitrite, UA Negative     Protein, UA Negative mg/dl      Glucose, UA Negative mg/dl      Ketones, UA Negative mg/dl      Urobilinogen, UA 0 2 E U /dl      Bilirubin, UA Negative     Blood, UA Trace     Specific [place], UA 1 025    Narrative:      CLINITEK RESULT                 MRI cervical spine wo contrast   Final Result by Candido Rao DO (04/06 1432)      Chronic compression deformities are seen within the T1 and T2 vertebral bodies  No resulting canal stenosis or foraminal narrowing  There is mild left uncinate joint hypertrophic degenerative change at the C3-4 level extending into the neural foramen resulting in mild foraminal narrowing without clear nerve compression  The canal is widely patent with no cord compression  The slight anterior subluxation of C4 upon C5 seen on recent x-ray is not evident on this examination, likely reduced by supine positioning  Workstation performed: [id number]               Procedures  Procedures      ED Course  ED Course as of Apr 06 1536   Tue Apr 06, 2021   1454 Chronic compression deformities are seen within the T1 and T2 vertebral bodies  No resulting canal stenosis or foraminal narrowing      There is mild left uncinate joint hypertrophic degenerative change at the C3-4 level extending into the neural foramen resulting in mild foraminal narrowing without clear nerve compression      The canal is widely patent with no cord compression      The slight anterior subluxation of C4 upon C5 seen on recent x-ray is not evident on this examination, likely reduced by supine positioning  MRI cervical spine wo contrast   1503 Discussed results with patient  He                                 SBIRT 22yo+      Most Recent Value   SBIRT (24 yo +)   In order to provide better care to our patients, we are screening all of our patients for alcohol and drug use  Would it be okay to ask you these screening questions? No Filed at: 04/06/2021 1100                MDM  44 yo male with PMH depression, ADHD, presenting with concern for worsening neck pain with subjective upper extremity numbness/weakness and urinary urgency/hesitency  Will obtain UA, GC/Chlamydia to evaluate for UTI/STI  Given pt's significant concern will obtain MRI to evaluate for cord compression  Will treat with toradol  If no acute abnormality seen likely discharge with comprehensive spine follow up and strict return precautions     Disposition  Final diagnoses:   Thoracic compression fracture ([address] ) - Chronic, T1, T2     Time reflects when diagnosis was documented in both MDM as applicable and the Disposition within this note     Time User Action Codes Description Comment 4/6/2021  2:58 PM Miranda Pettit Add [Z94 [id number]] Thoracic compression fracture ([facility] [address] )     4/6/2021  2:58 PM Miranda Pettit Modify [E33 [id number]] Thoracic compression fracture ([facility] [address] ) Chronic, T1, T2      ED Disposition     ED Disposition Condition Date/Time Comment    Discharge Stable Tue Apr 6, 2021  2:55 PM Malina Alves discharge to home/self care  Follow-up Information     Follow up With Specialties Details Why Contact Info Additional Information    Marcela Gibson MD Internal Medicine Schedule an appointment as soon as possible for a visit   [address] [facility] [address]       [address] [facility] Schedule an appointment as soon as possible for a visit   [phone]          Discharge Medication List as of 4/6/2021  3:00 PM      CONTINUE these medications which have NOT CHANGED    Details   tadalafil (CIALIS) 20 MG tablet Take 1 tablet (20 mg total) by mouth daily as needed for erectile dysfunction, Starting u 3/4/2021, Normal      amphetamine-dextroamphetamine (ADDERALL) 20 mg tablet Take 1 tablet (20 mg total) by mouth dailyMax Daily Amount: 20 mg, Starting Mon 4/26/2021, Until Wed 5/26/2021, Normal      FLUoxetine (PROzac) 40 MG capsule Take 2 capsules (80 mg total) by mouth daily, Starting Wed 3/31/2021, Normal      lisdexamfetamine (VYVANSE) 60 MG capsule Take 1 capsule (60 mg total) by mouth every morningMax Daily Amount: 60 mg, Starting Mon 4/26/2021, Until Wed 5/26/2021, Normal      methocarbamol (ROBAXIN) 500 mg tablet Take 1 tablet (500 mg total) by mouth daily at bedtime as needed for muscle spasms, Starting Wed 3/24/2021, Normal      traZODone (DESYREL) 50 mg tablet Take 1 tablet (50 mg total) by mouth daily at bedtime, Starting Wed 3/31/2021, Until Sun 5/30/2021, Normal           No discharge procedures on file      PDMP Review       Value Time User    PDMP Reviewed  Yes 3/31/2021  8:26 AM Lety Valdez MD ED Provider  Attending physically available and evaluated Shaan Nicci GREGORY managed the patient along with the ED Attending      Electronically Signed by         Savanna Camejo MD  04/06/21 9899

## 2021-04-06 NOTE — Clinical Note
Gildardo Martinez was seen and treated in our emergency department on 4/6/2021  Diagnosis: Radha Grey  may return to work on return date  He may return on this date: 04/08/2021         If you have any questions or concerns, please don't hesitate to call        Betsy Celaya MD    ______________________________           _______________          _______________  Hospital Representative                              Date                                Time

## 2021-04-06 NOTE — DISCHARGE INSTRUCTIONS
Return to the emergency department for persistent weakness or numbness in your arms or legs, loss of bowel or bladder control, numbness in your groin region, fever, inability to walk, any other new or concerning symptoms

## 2021-04-06 NOTE — ED ATTENDING ATTESTATION
4/6/2021  IOlive MD, saw and evaluated the patient  I have discussed the patient with the resident/non-physician practitioner and agree with the resident's/non-physician practitioner's findings, Plan of Care, and MDM as documented in the resident's/non-physician practitioner's note, except where noted  All available labs and Radiology studies were reviewed  I was present for key portions of any procedure(s) performed by the resident/non-physician practitioner and I was immediately available to provide assistance  At this point I agree with the current assessment done in the Emergency Department  I have conducted an independent evaluation of this patient a history and physical is as follows:    ED Course    25-year-old male presents with worsening of his chronic cervical neck pain bilateral upper extremity numbness and weakness onset symptoms occured last night  He denies difficulty walking  Possible urinary incontinence  Denies saddle anesthesia    Denies fevers chills  Recent outpatient x-ray narrowing of C4-5 5 6  Patient reports a remote history of trauma with neck injury secondary to snowboarding accident no recent trauma  Vitals reviewed    Patient has mild lower cervical upper thoracic spinal tenderness to palpation    Strength is 5/5 bilaterally in upper  Lower extremity strength is at baseline (patient has 4/5 strength in left lower extremity secondary to prior lower extremity vascular injury requiring fasciotomy) sensation grossly intact    Impression neck pain reticular symptoms will check MRI of cervical spine exclude acute cord compression    Pain control anticipate discharge with follow-up with comprehensive Spine           Critical Care Time  Procedures

## 2021-04-07 LAB
C TRACH DNA SPEC QL NAA+PROBE: NEGATIVE
N GONORRHOEA DNA SPEC QL NAA+PROBE: NEGATIVE

## 2021-04-14 ENCOUNTER — TELEMEDICINE (OUTPATIENT)
Dept: INTERNAL MEDICINE CLINIC | Facility: CLINIC | Age: 36
End: 2021-04-14
Payer: COMMERCIAL

## 2021-04-14 VITALS — BODY MASS INDEX: 25.06 KG/M2 | HEIGHT: 72 IN | WEIGHT: 185 LBS

## 2021-04-14 DIAGNOSIS — B34.9 VIRAL INFECTION, UNSPECIFIED: Primary | ICD-10-CM

## 2021-04-14 PROCEDURE — 99213 OFFICE O/P EST LOW 20 MIN: CPT | Performed by: INTERNAL MEDICINE

## 2021-04-14 PROCEDURE — 3008F BODY MASS INDEX DOCD: CPT | Performed by: INTERNAL MEDICINE

## 2021-04-14 PROCEDURE — 1036F TOBACCO NON-USER: CPT | Performed by: INTERNAL MEDICINE

## 2021-04-14 NOTE — PROGRESS NOTES
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  Disposition:     After clarifying the patient's history, my suspicion for COVID-19 infection is very low  I have spent 15 minutes directly with the patient  Greater than 50% of this time was spent in counseling/coordination of care regarding: diagnostic results, prognosis, risks and benefits of treatment options, instructions for management, patient and family education, importance of treatment compliance, risk factor reductions and impressions  Encounter provider Sherin Llanos MD    Provider located at [address] [place] [id number]    Recent Visits  No visits were found meeting these conditions  Showing recent visits within past 7 days and meeting all other requirements     Today's Visits  Date Type Provider Dept   04/14/21 Telemedicine Sherin Llanos MD [facility]   Showing today's visits and meeting all other requirements     Future Appointments  No visits were found meeting these conditions  Showing future appointments within next 150 days and meeting all other requirements      This virtual check-in was done via [facility] and patient was informed that this is a secure, HIPAA-compliant platform  He agrees to proceed  Patient agrees to participate in a virtual check in via telephone or video visit instead of presenting to the office to address urgent/immediate medical needs  Patient is aware this is a billable service      After connecting through [facility], the patient was identified by name and date of birth  Gabe Osborne was informed that this was a telemedicine visit and that the exam was being conducted confidentially over secure lines  My office door was closed  No one else was in the room  Gabe Osborne acknowledged consent and understanding of privacy and security of the telemedicine visit  I informed the patient that I have reviewed his record in Epic and presented the opportunity for him to ask any questions regarding the visit today  The patient agreed to participate  Subjective: Gabe Osborne is a 43 y o  male who is concerned about COVID-19  Patient's symptoms include malaise, nasal congestion and cough  Patient denies fever, chills, fatigue, rhinorrhea, sore throat, anosmia, loss of taste, shortness of breath, chest tightness, abdominal pain, nausea, vomiting, diarrhea, myalgias and headaches  Date of symptom onset: 4/12/2021  Date of exposure: 4/15/2021    Exposure:   Contact with a person who is under investigation (PUI) for or who is positive for COVID-19 within the last 14 days?: No    Hospitalized recently for fever and/or lower respiratory symptoms?: No      Currently a healthcare worker that is involved in direct patient care?: No      Works in a special setting where the risk of COVID-19 transmission may be high? (this may include long-term care, correctional and senior care facilities; homeless shelters; assisted-living facilities and group homes ): No      Resident in a special setting where the risk of COVID-19 transmission may be high? (this may include long-term care, correctional and senior care facilities; homeless shelters; assisted-living facilities and group homes ): No      57-year-old male seen today after experiencing viral URI symptoms  His daughter to which he was last in contact with on 04/12/2021 also had viral URI symptoms as did his ex/ her mother    He was sent for COVID testing yesterday to which PCR is negative  His symptoms are overall improving  He has received both COVID vaccines, [employer], back in December 2020  Lab Results   Component Value Date    [address] Not Detected 04/12/2021     Past Medical History:   Diagnosis Date    Benign non-nodular prostatic hyperplasia with lower urinary tract symptoms      Past Surgical History:   Procedure Laterality Date    WISDOM TOOTH EXTRACTION       Current Outpatient Medications   Medication Sig Dispense Refill    [START ON 4/26/2021] amphetamine-dextroamphetamine (ADDERALL) 20 mg tablet Take 1 tablet (20 mg total) by mouth dailyMax Daily Amount: 20 mg 30 tablet 0    FLUoxetine (PROzac) 40 MG capsule Take 2 capsules (80 mg total) by mouth daily 60 capsule 1    [START ON 4/26/2021] lisdexamfetamine (VYVANSE) 60 MG capsule Take 1 capsule (60 mg total) by mouth every morningMax Daily Amount: 60 mg 30 capsule 0    methocarbamol (ROBAXIN) 500 mg tablet Take 1 tablet (500 mg total) by mouth daily at bedtime as needed for muscle spasms 30 tablet 0    tadalafil (CIALIS) 20 MG tablet Take 1 tablet (20 mg total) by mouth daily as needed for erectile dysfunction 30 tablet 3    traZODone (DESYREL) 50 mg tablet Take 1 tablet (50 mg total) by mouth daily at bedtime 30 tablet 1     No current facility-administered medications for this visit  No Known Allergies    Review of Systems   Constitutional: Negative for activity change, appetite change, chills, diaphoresis, fatigue and fever  HENT: Positive for congestion  Negative for postnasal drip, rhinorrhea, sinus pressure, sinus pain, sneezing and sore throat  Eyes: Negative for visual disturbance  Respiratory: Positive for cough  Negative for apnea, choking, chest tightness, shortness of breath and wheezing  Cardiovascular: Negative for chest pain, palpitations and leg swelling     Gastrointestinal: Negative for abdominal distention, abdominal pain, anal bleeding, blood in stool, constipation, diarrhea, nausea and vomiting  Endocrine: Negative for cold intolerance and heat intolerance  Genitourinary: Negative for difficulty urinating, dysuria and hematuria  Musculoskeletal: Negative  Negative for myalgias  Skin: Negative  Neurological: Negative for dizziness, weakness, light-headedness, numbness and headaches  Hematological: Negative for adenopathy  Psychiatric/Behavioral: Negative for agitation, sleep disturbance and suicidal ideas  All other systems reviewed and are negative  Objective:    Vitals:    04/14/21 0938   Weight: 83 9 kg (185 lb)   Height: 5' 11 5" (1 816 m)       Physical Exam  Vitals signs and nursing note reviewed  Constitutional:       General: He is not in acute distress  Appearance: Normal appearance  He is not ill-appearing  HENT:      Head: Normocephalic and atraumatic  Nose: Nose normal       Mouth/Throat:      Mouth: Mucous membranes are moist       Pharynx: No oropharyngeal exudate or posterior oropharyngeal erythema  Eyes:      General: No scleral icterus  Extraocular Movements: Extraocular movements intact  Conjunctiva/sclera: Conjunctivae normal       Pupils: Pupils are equal, round, and reactive to light  Neck:      Musculoskeletal: Normal range of motion  Pulmonary:      Effort: Pulmonary effort is normal  No respiratory distress  Breath sounds: No wheezing  Abdominal:      General: Abdomen is flat  There is no distension  Tenderness: There is no abdominal tenderness  Musculoskeletal:         General: No swelling, deformity or signs of injury  Skin:     General: Skin is warm and dry  Coloration: Skin is not jaundiced or pale  Findings: No bruising, erythema or rash  Neurological:      General: No focal deficit present  Mental Status: He is alert and oriented to person, place, and time  Mental status is at baseline  Cranial Nerves: No cranial nerve deficit     Psychiatric:         Mood and Affect: Mood normal          Behavior: Behavior normal          Thought Content: Thought content normal          Judgment: Judgment normal        VIRTUAL VISIT DISCLAIMER    [facility] acknowledges that he has consented to an online visit or consultation  He understands that the online visit is based solely on information provided by him, and that, in the absence of a face-to-face physical evaluation by the physician, the diagnosis he receives is both limited and provisional in terms of accuracy and completeness  This is not intended to replace a full medical face-to-face evaluation by the physician  [facility] understands and accepts these terms

## 2021-04-14 NOTE — ASSESSMENT & PLAN NOTE
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

## 2021-04-14 NOTE — LETTER
April 14, 2021     Patient: Jessica Roth   YOB: 1985   Date of Visit: 4/14/2021       To Whom it May Concern: Jessica Roth is under my professional care  He was seen in my office on 4/14/2021  He may return to school on 4/19/2021  If you have any questions or concerns, please don't hesitate to call           Sincerely,          Dany Moyer MD

## 2021-04-22 DIAGNOSIS — F32.9 MAJOR DEPRESSIVE DISORDER, REMISSION STATUS UNSPECIFIED, UNSPECIFIED WHETHER RECURRENT: ICD-10-CM

## 2021-04-22 DIAGNOSIS — F90.0 ATTENTION DEFICIT HYPERACTIVITY DISORDER (ADHD), PREDOMINANTLY INATTENTIVE TYPE: ICD-10-CM

## 2021-04-22 RX ORDER — BUPROPION HYDROCHLORIDE 150 MG/1
TABLET ORAL
Qty: 30 TABLET | Refills: 1 | OUTPATIENT
Start: 2021-04-22

## 2021-04-23 DIAGNOSIS — M54.2 CERVICAL PAIN (NECK): ICD-10-CM

## 2021-04-26 RX ORDER — METHOCARBAMOL 500 MG/1
500 TABLET, FILM COATED ORAL
Qty: 30 TABLET | Refills: 0 | Status: SHIPPED | OUTPATIENT
Start: 2021-04-26

## 2021-04-28 ENCOUNTER — PATIENT MESSAGE (OUTPATIENT)
Dept: INTERNAL MEDICINE CLINIC | Facility: CLINIC | Age: 36
End: 2021-04-28

## 2021-04-28 ENCOUNTER — EVALUATION (OUTPATIENT)
Dept: PHYSICAL THERAPY | Facility: REHABILITATION | Age: 36
End: 2021-04-28
Payer: COMMERCIAL

## 2021-04-28 DIAGNOSIS — M54.2 CERVICAL PAIN (NECK): Primary | ICD-10-CM

## 2021-04-28 DIAGNOSIS — M54.2 CERVICAL PAIN (NECK): ICD-10-CM

## 2021-04-28 PROCEDURE — 97162 PT EVAL MOD COMPLEX 30 MIN: CPT | Performed by: PHYSICAL THERAPIST

## 2021-04-28 NOTE — PROGRESS NOTES
PT Evaluation     Today's date: 2021  Patient name: Tamie Booth  : 1985  MRN: [id number]  Referring provider: Lul Mckeon MD  Dx:   Encounter Diagnosis     ICD-10-CM    1  Cervical pain (neck)  M54 2 Ambulatory referral to Physical Therapy                  Assessment  Assessment details: Tamie Booth is a pleasant 43 y o  male who presents with chronic upper thoracic spine pain  The patient's greatest concerns are the pain @HIS@ is experiencing, worry over not knowing what's wrong, concern at no signs of improvement, fear of not being able to keep active and future ill health (and wanting to prevent it)  No further referral appears necessary at this time based upon examination results  Primary movement impairment diagnosis of cervical spine hypomobility, postural control deficit, thoracic spine hypomobility resulting in pathoanatomical symptoms of chronic neck pain which is limiting his ability to exercise or recreation, go to work and turn to look over shoulder  Physical exam does not reproduce any radicular symptoms  Cervical myelopathy testing is negative  These symptoms will be monitored in future tx if they return  Patient's presentation is consistent with neck pain with postural control deficit treatment classification  Pt  will benefit from skilled PT services that includes manual therapy techniques to enhance tissue extensibility, neuromuscular re-education to facilitate motor control, therapeutic exercise to increase functional mobility, and modalities prn to reduce pain and inflammation        Primary Impairments:  1) cervical spine hypomobility  2) postural control deficit  3) thoracic spine hypomobility       Impairments: abnormal coordination, abnormal muscle firing, abnormal muscle tone, abnormal or restricted ROM, abnormal movement, activity intolerance, difficulty understanding, impaired physical strength, lacks appropriate home exercise program, pain with function, poor posture and poor body mechanics    Symptom irritability: moderateUnderstanding of Dx/Px/POC: good   Prognosis: good    Goals  STG  Patient will be independent with home exercise program in 1-2 visits  Patient will tolerate 1 hour of continuous walking without increase in symptoms in 3 weeks  Patient will report 3/10 pain at worst in 3 weeks  Patient will demonstrate 10s DNF endurance test in 3 weeks  LTG  Patient will be independent in comprehensive HEP upon discharge  Patient will report no pain with treadmill walking upon discharge  Patient will return to PLOF exercise routine upon discharge  Patient will demonstrate 25s DNF endurance test upon discharge  Plan  Patient would benefit from: skilled physical therapy  Planned therapy interventions: activity modification, joint mobilization, manual therapy, motor coordination training, neuromuscular re-education, patient education, self care, therapeutic activities, therapeutic exercise, graded activity, home exercise program, behavior modification, graded exercise, functional ROM exercises, strengthening and postural training  Frequency: 2x week  Duration in weeks: 8  Treatment plan discussed with: patient        Subjective Evaluation    History of Present Illness  Mechanism of injury: Patient reports a 5-6 month history of upper back pain  He had been walking at home with a 20 pound weight vest on a treadmill and had to stop because of the pain  Patient started to experience progressive neurologic symptoms of B/L LE pain and B/L UE weakness  He had an MRI performed demonstrating no spinal cord compression  He began taking NSAIDs which has improved his symptoms  He is concerned because 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 and 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 perform patient transfers and heavy lifting  Patient reports no longer having any radicular symptoms  Patient denies 5 D's/3 N's      Pain  Current pain ratin  At best pain ratin  At worst pain ratin  Location: Central upper thoracic  Quality: throbbing and sharp  Progression: improved    Treatments  Previous treatment: medication  Patient Goals  Patient goals for therapy: decreased pain, improved balance, increased motion, increased strength, return to sport/leisure activities and return to work          Objective     General Comments:      Cervical/Thoracic Comments  Gait:  WNL     Standing Posture: thoracic kyphotic deformity @ T1; forward head posture     Cervical Spine ROM:   Flexion: WNL; hinging @ CTJ w/ mid cervical extension  Extension: WNL; hinging @ lower cervical  SB: R   45 deg  L 45 deg   Rotation: R 80 deg     L 80 deg  Retraction: 75% lmitation     Thoracic Spine ROM:   Flexion: WNL  Extension: 75% limited    Scapular retraction: poor periscapular activation; upper trap compensation    Palpation: TTP CPA T1/T2; TTP R UPA T1    Special Tests:   Modified VBI: Neg B/L  Sharp Purcer: neg B/L  Alar Ligament: neg B/L  Compression: neg  Distraction: neg  Spurling A: neg  DNF endurance: unable to perform without compensation    Upper Quarter Screen:   Dermatomes: WNL B/L  Myotomes:  WNL B/L  Reflexes: 2+ biceps/brachioradilais B/L  Ortiz: neg    Upper Quarter Strength: WNL B/L                 Precautions: ED visit for neuro symptoms 21      Manuals                                        Neuro Re-Ed        Cervical retraction 20x HEP       B/L scap retraction 20x otb HEP                                               Ther Ex        UBE/TM walk        Seated thoracic extension        Supine thoracic extension                                                 Ther Activity                        Gait Training                        Modalities

## 2021-05-03 ENCOUNTER — PATIENT MESSAGE (OUTPATIENT)
Dept: INTERNAL MEDICINE CLINIC | Facility: CLINIC | Age: 36
End: 2021-05-03

## 2021-05-03 DIAGNOSIS — F32.9 MAJOR DEPRESSIVE DISORDER, REMISSION STATUS UNSPECIFIED, UNSPECIFIED WHETHER RECURRENT: ICD-10-CM

## 2021-05-03 DIAGNOSIS — F90.0 ATTENTION DEFICIT HYPERACTIVITY DISORDER (ADHD), PREDOMINANTLY INATTENTIVE TYPE: ICD-10-CM

## 2021-05-03 DIAGNOSIS — Z11.3 SCREEN FOR STD (SEXUALLY TRANSMITTED DISEASE): Primary | ICD-10-CM

## 2021-05-06 ENCOUNTER — APPOINTMENT (OUTPATIENT)
Dept: LAB | Age: 36
End: 2021-05-06
Payer: COMMERCIAL

## 2021-05-06 DIAGNOSIS — N52.9 ERECTILE DYSFUNCTION, UNSPECIFIED ERECTILE DYSFUNCTION TYPE: ICD-10-CM

## 2021-05-06 DIAGNOSIS — N40.1 BENIGN PROSTATIC HYPERPLASIA WITH URINARY HESITANCY: ICD-10-CM

## 2021-05-06 DIAGNOSIS — F90.0 ATTENTION DEFICIT HYPERACTIVITY DISORDER (ADHD), PREDOMINANTLY INATTENTIVE TYPE: ICD-10-CM

## 2021-05-06 DIAGNOSIS — F32.9 MAJOR DEPRESSIVE DISORDER, REMISSION STATUS UNSPECIFIED, UNSPECIFIED WHETHER RECURRENT: ICD-10-CM

## 2021-05-06 DIAGNOSIS — Z11.3 SCREEN FOR STD (SEXUALLY TRANSMITTED DISEASE): ICD-10-CM

## 2021-05-06 DIAGNOSIS — Z13.220 LIPID SCREENING: ICD-10-CM

## 2021-05-06 DIAGNOSIS — R39.11 BENIGN PROSTATIC HYPERPLASIA WITH URINARY HESITANCY: ICD-10-CM

## 2021-05-06 LAB
ALBUMIN SERPL BCP-MCNC: 4 G/DL (ref 3.5–5)
ALP SERPL-CCNC: 56 U/L (ref 46–116)
ALT SERPL W P-5'-P-CCNC: 26 U/L (ref 12–78)
ANION GAP SERPL CALCULATED.3IONS-SCNC: 3 MMOL/L (ref 4–13)
AST SERPL W P-5'-P-CCNC: 15 U/L (ref 5–45)
BILIRUB SERPL-MCNC: 0.62 MG/DL (ref 0.2–1)
BUN SERPL-MCNC: 18 MG/DL (ref 5–25)
CALCIUM SERPL-MCNC: 9 MG/DL (ref 8.3–10.1)
CHLORIDE SERPL-SCNC: 107 MMOL/L (ref 100–108)
CHOLEST SERPL-MCNC: 169 MG/DL (ref 50–200)
CO2 SERPL-SCNC: 30 MMOL/L (ref 21–32)
CREAT SERPL-MCNC: 0.94 MG/DL (ref 0.6–1.3)
ERYTHROCYTE [DISTWIDTH] IN BLOOD BY AUTOMATED COUNT: 11.9 % (ref 11.6–15.1)
GFR SERPL CREATININE-BSD FRML MDRD: 102 ML/MIN/1.73SQ M
GLUCOSE P FAST SERPL-MCNC: 93 MG/DL (ref 65–99)
HBV CORE AB SER QL: NORMAL
HBV CORE IGM SER QL: NORMAL
HBV SURFACE AG SER QL: NORMAL
HCT VFR BLD AUTO: 45.1 % (ref 36.5–49.3)
HCV AB SER QL: NORMAL
HDLC SERPL-MCNC: 46 MG/DL
HGB BLD-MCNC: 15.3 G/DL (ref 12–17)
LDLC SERPL CALC-MCNC: 102 MG/DL (ref 0–100)
MCH RBC QN AUTO: 31.2 PG (ref 26.8–34.3)
MCHC RBC AUTO-ENTMCNC: 33.9 G/DL (ref 31.4–37.4)
MCV RBC AUTO: 92 FL (ref 82–98)
NONHDLC SERPL-MCNC: 123 MG/DL
PLATELET # BLD AUTO: 192 THOUSANDS/UL (ref 149–390)
PMV BLD AUTO: 10.8 FL (ref 8.9–12.7)
POTASSIUM SERPL-SCNC: 4.1 MMOL/L (ref 3.5–5.3)
PROT SERPL-MCNC: 6.7 G/DL (ref 6.4–8.2)
PSA SERPL-MCNC: 2.8 NG/ML (ref 0–4)
RBC # BLD AUTO: 4.91 MILLION/UL (ref 3.88–5.62)
RPR SER QL: NORMAL
SODIUM SERPL-SCNC: 140 MMOL/L (ref 136–145)
TRIGL SERPL-MCNC: 103 MG/DL
TSH SERPL DL<=0.05 MIU/L-ACNC: 0.51 UIU/ML (ref 0.36–3.74)
WBC # BLD AUTO: 5.42 THOUSAND/UL (ref 4.31–10.16)

## 2021-05-06 PROCEDURE — 80061 LIPID PANEL: CPT

## 2021-05-06 PROCEDURE — 87389 HIV-1 AG W/HIV-1&-2 AB AG IA: CPT

## 2021-05-06 PROCEDURE — 86592 SYPHILIS TEST NON-TREP QUAL: CPT

## 2021-05-06 PROCEDURE — 36415 COLL VENOUS BLD VENIPUNCTURE: CPT

## 2021-05-06 PROCEDURE — 87591 N.GONORRHOEAE DNA AMP PROB: CPT

## 2021-05-06 PROCEDURE — G0103 PSA SCREENING: HCPCS

## 2021-05-06 PROCEDURE — 87491 CHLMYD TRACH DNA AMP PROBE: CPT

## 2021-05-06 PROCEDURE — 86704 HEP B CORE ANTIBODY TOTAL: CPT

## 2021-05-06 PROCEDURE — 85027 COMPLETE CBC AUTOMATED: CPT

## 2021-05-06 PROCEDURE — 87340 HEPATITIS B SURFACE AG IA: CPT

## 2021-05-06 PROCEDURE — 80053 COMPREHEN METABOLIC PANEL: CPT

## 2021-05-06 PROCEDURE — 84443 ASSAY THYROID STIM HORMONE: CPT

## 2021-05-06 PROCEDURE — 86705 HEP B CORE ANTIBODY IGM: CPT

## 2021-05-06 PROCEDURE — 86803 HEPATITIS C AB TEST: CPT

## 2021-05-07 LAB
C TRACH DNA SPEC QL NAA+PROBE: NEGATIVE
HIV 1+2 AB+HIV1 P24 AG SERPL QL IA: NORMAL
N GONORRHOEA DNA SPEC QL NAA+PROBE: NEGATIVE

## 2021-05-11 ENCOUNTER — TELEPHONE (OUTPATIENT)
Dept: PSYCHIATRY | Facility: CLINIC | Age: 36
End: 2021-05-11

## 2021-05-19 ENCOUNTER — OFFICE VISIT (OUTPATIENT)
Dept: OBGYN CLINIC | Facility: CLINIC | Age: 36
End: 2021-05-19
Payer: COMMERCIAL

## 2021-05-19 VITALS
SYSTOLIC BLOOD PRESSURE: 124 MMHG | BODY MASS INDEX: 25.9 KG/M2 | HEART RATE: 76 BPM | HEIGHT: 71 IN | WEIGHT: 185 LBS | DIASTOLIC BLOOD PRESSURE: 73 MMHG

## 2021-05-19 DIAGNOSIS — M54.2 CERVICALGIA: ICD-10-CM

## 2021-05-19 PROCEDURE — 1036F TOBACCO NON-USER: CPT | Performed by: ORTHOPAEDIC SURGERY

## 2021-05-19 PROCEDURE — 99203 OFFICE O/P NEW LOW 30 MIN: CPT | Performed by: ORTHOPAEDIC SURGERY

## 2021-05-19 PROCEDURE — 3008F BODY MASS INDEX DOCD: CPT | Performed by: ORTHOPAEDIC SURGERY

## 2021-05-19 NOTE — PROGRESS NOTES
Assessment:   Diagnosis ICD-10-CM Associated Orders   1  Cervicalgia  M54 2 Ambulatory referral to Orthopedic Surgery       Plan:    MRI and xray reviewed with patient no cord compression, no stenosis, mild anterior subluxation of C4/5  Protrusion of lower segment of cervical spine is over spinous processess and likely inflammatory in nature  5/5 strength bilateral, sensation intact, neurologically stable  He does have positive xie's bilateral, hypoactive reflexes at C5/6/7 bilateral  Continue with physical therapy  PRN  To do next visit:  Return if symptoms worsen or fail to improve  The above stated was discussed in layman's terms and the patient expressed understanding  All questions were answered to the patient's satisfaction  Scribe Attestation    I,:  Enmanuel Mckenzie am acting as a scribe while in the presence of the attending physician :       I,:  Jonny Fenton MD personally performed the services described in this documentation    as scribed in my presence :             Subjective: Yvonne Odom is a 43 y o  male who presents for evaluation of cervical neck pain  Pain is axial in nature lower segment, patient notes protrusion lower segment cervical  Ongoing for months with no injury or trauma  Pain was so bad a few weeks ago he went to ED  He reports no pain radiating into his arms, denies numbness, tingling, weakness  No reported balance issues  He has used robaxin, OTC anti inflammatories for pain  He has started 2 sessions of physical therapy  Review of systems negative unless otherwise specified in HPI  Review of Systems   Constitutional: Negative for chills and fever  HENT: Negative for ear pain and sore throat  Eyes: Negative for pain and visual disturbance  Respiratory: Negative for cough and shortness of breath  Cardiovascular: Negative for chest pain and palpitations  Gastrointestinal: Negative for abdominal pain and vomiting     Genitourinary: Negative for dysuria and hematuria  Musculoskeletal: Negative for arthralgias and back pain  Skin: Negative for color change and rash  Neurological: Negative for seizures and syncope  All other systems reviewed and are negative        Past Medical History:   Diagnosis Date    Benign non-nodular prostatic hyperplasia with lower urinary tract symptoms        Past Surgical History:   Procedure Laterality Date    WISDOM TOOTH EXTRACTION         Family History   Problem Relation Age of Onset    No Known Problems Mother     No Known Problems Father        Social History     Occupational History    Not on file   Tobacco Use    Smoking status: Never Smoker    Smokeless tobacco: Never Used   Substance and Sexual Activity    Alcohol use: Yes     Frequency: 2-3 times a week    Drug use: Never    Sexual activity: Yes         Current Outpatient Medications:     amphetamine-dextroamphetamine (ADDERALL) 20 mg tablet, Take 1 tablet (20 mg total) by mouth dailyMax Daily Amount: 20 mg, Disp: 30 tablet, Rfl: 0    FLUoxetine (PROzac) 40 MG capsule, Take 2 capsules (80 mg total) by mouth daily, Disp: 60 capsule, Rfl: 1    lisdexamfetamine (VYVANSE) 60 MG capsule, Take 1 capsule (60 mg total) by mouth every morningMax Daily Amount: 60 mg, Disp: 30 capsule, Rfl: 0    methocarbamol (ROBAXIN) 500 mg tablet, Take 1 tablet (500 mg total) by mouth daily at bedtime as needed for muscle spasms, Disp: 30 tablet, Rfl: 0    tadalafil (CIALIS) 20 MG tablet, Take 1 tablet (20 mg total) by mouth daily as needed for erectile dysfunction, Disp: 30 tablet, Rfl: 3    traZODone (DESYREL) 50 mg tablet, Take 1 tablet (50 mg total) by mouth daily at bedtime, Disp: 30 tablet, Rfl: 1    No Known Allergies         Vitals:    05/19/21 1458   BP: 124/73   Pulse: 76       Objective:                    Back Exam     Comments:  Cervical spine   No acute distress, skin intact  No specific tenderness to palpation cervical   C2-T1 5/5 strength bilateral C2-T1 sensation intact and symmetric   Positive xie bilateral   C5/6/7 bilateral hypoactive             Diagnostics, reviewed and taken today if performed as documented: The attending physician has personally reviewed the pertinent films in PACS and interpretation is as follows: MRI cervical spine chronic compression deformities at T1 and T2, no cord compression, no significant stenosis, slight anterior subluxation of C4 and C5       Procedures, if performed today:    Procedures    None performed      Portions of the record may have been created with voice recognition software  Occasional wrong word or "sound a like" substitutions may have occurred due to the inherent limitations of voice recognition software  Read the chart carefully and recognize, using context, where substitutions have occurred

## 2021-05-21 NOTE — PROGRESS NOTES
Patient has not returned for treatment and will be D/C at this time in accordance with company noncompliance policy  A formal re-evaluation and assessment of goals was unable to be performed

## 2021-05-25 ENCOUNTER — TELEPHONE (OUTPATIENT)
Dept: PSYCHIATRY | Facility: CLINIC | Age: 36
End: 2021-05-25

## 2021-05-25 DIAGNOSIS — F32.9 MAJOR DEPRESSIVE DISORDER, REMISSION STATUS UNSPECIFIED, UNSPECIFIED WHETHER RECURRENT: ICD-10-CM

## 2021-05-25 DIAGNOSIS — F90.0 ATTENTION DEFICIT HYPERACTIVITY DISORDER (ADHD), PREDOMINANTLY INATTENTIVE TYPE: ICD-10-CM

## 2021-05-25 RX ORDER — TRAZODONE HYDROCHLORIDE 50 MG/1
50 TABLET ORAL
Qty: 30 TABLET | Refills: 1 | Status: SHIPPED | OUTPATIENT
Start: 2021-05-25 | End: 2021-07-24

## 2021-05-25 RX ORDER — DEXTROAMPHETAMINE SACCHARATE, AMPHETAMINE ASPARTATE, DEXTROAMPHETAMINE SULFATE AND AMPHETAMINE SULFATE 5; 5; 5; 5 MG/1; MG/1; MG/1; MG/1
20 TABLET ORAL DAILY
Qty: 30 TABLET | Refills: 0 | Status: SHIPPED | OUTPATIENT
Start: 2021-05-25 | End: 2021-06-24

## 2021-05-25 NOTE — PSYCH
Virtual Regular Visit    Assessment/Plan:    Problem List Items Addressed This Visit        Other    Attention deficit hyperactivity disorder (ADHD), predominantly inattentive type - Primary    Major depressive disorder               Reason for visit is   Chief Complaint   Patient presents with    Medication Management    ADHD    Depression        Encounter provider Óscar Magana MD    Provider located at: [address] 3    Recent Visits  No visits were found meeting these conditions  Showing recent visits within past 7 days and meeting all other requirements     Today's Visits  Date Type Provider Dept   05/26/21 Telemedicine Óscar Magana MD [facility] 18 today's visits and meeting all other requirements     Future Appointments  No visits were found meeting these conditions  Showing future appointments within next 150 days and meeting all other requirements        The patient was identified by name and date of birth  Mike Turner was informed that this is a telemedicine visit and that the visit is being conducted through [address] [place] [address] Now and patient was informed that this is a secure, HIPAA-compliant platform  He agrees to proceed  My office door was closed  No one else was in the room  He acknowledged consent and understanding of privacy and security of the video platform  The patient has agreed to participate and understands they can discontinue the visit at any time  Patient is aware this is a billable service  MEDICATION MANAGEMENT NOTE        [facility]      Name and Date of Birth:   Mike Turner 43 y o  1985 MRN: [id number]    Date of Visit: May 26, 2021    Reason for Visit:   Chief Complaint   Patient presents with    Medication Management    ADHD    Depression       SUBJECTIVE:  The patient was visited virtually for medication management and follow up visit for ADHD and depression  Presented calm, and cooperative  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  He is going to move to [place] in June to 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[facility] [place]  Reported back pain has been worse since stopped taking NSAIDS, and is considering PT, and has scheduled appointment with PCP in [place]  Denied any changes in sleep, appetite, concentration, energy level, or daily activities  Denied feelings of anhedonia, hopelessness, helplessness, worthlessness or guilt and appeared to be future oriented  There was no thought constriction related to death  Denied SI/HI, intent or plan upon direct inquiry at this time  Denied AV/H  No anxiety sxs, specific phobia or panic attacks reported  No manic sxs, paranoid ideations or fixed delusions were elicited  Endorsed good compliance with the medications and denied any side effects  Denied smoking cigarettes, binge drinking alcohol or other illicit substance use  Given this presentation, medications are maintained at the same dosage  The patient will start therapy after moving to [place], and will follow up w/ outpatient care with the provider there  So the case will be closed as the patient is moving out of state  The patient was educated to call 911 or go to the nearest emergency room if the symptoms become overwhelming or unable to remain in control  Verbalized understanding and agreed to seek help in case of distress or concern for safety  Review Of Systems:  Pertinent items are noted in HPI; all others are negative; no recent changes in medications or health status reported        Past Psychiatric History Update:   - No inpatient psychiatric admission since last encounter  - No SA or SIB since last encounter  - No incidence of violent behavior since last encounter    Past Trauma History Update:    - No new onset of abuse or traumatic events since last encounter     Past Medical History:    Past Medical History:   Diagnosis Date    Benign non-nodular prostatic hyperplasia with lower urinary tract symptoms      No past medical history pertinent negatives    Past Surgical History:   Procedure Laterality Date    WISDOM TOOTH EXTRACTION       No Known Allergies    Substance Abuse History:    Social History     Substance and Sexual Activity   Alcohol Use Yes    Frequency: 2-3 times a week     Social History     Substance and Sexual Activity   Drug Use Never       Social History:    Social History     Socioeconomic History    Marital status: Single     Spouse name: Not on file    Number of children: Not on file    Years of education: Not on file    Highest education level: Not on file   Occupational History    Not on file   Social Needs    Financial resource strain: Not on file    Food insecurity     Worry: Not on file     Inability: Not on file    Transportation needs     Medical: Not on file     Non-medical: Not on file   Tobacco Use    Smoking status: Never Smoker    Smokeless tobacco: Never Used   Substance and Sexual Activity    Alcohol use: Yes     Frequency: 2-3 times a week    Drug use: Never    Sexual activity: Yes   Lifestyle    Physical activity     Days per week: Not on file     Minutes per session: Not on file    Stress: Not on file   Relationships    Social connections     Talks on phone: Not on file     Gets together: Not on file     Attends Advent service: Not on file     Active member of club or organization: Not on file     Attends meetings of clubs or organizations: Not on file     Relationship status: Not on file    Intimate partner violence     Fear of current or ex partner: Not on file     Emotionally abused: Not on file     Physically abused: Not on file     Forced sexual activity: Not on file   Other Topics Concern    Not on file   Social History Narrative    Not on file       Family Psychiatric History:     Family History   Problem Relation Age of Onset    No Known Problems Mother     No Known Problems Father        History Review:  The following portions of the patient's history were reviewed and updated as appropriate: allergies, current medications, past family history, past medical history, past social history, past surgical history and problem list        OBJECTIVE:     Vital signs in last 24 hours:    Vitals:       Mental Status Evaluation:  Appearance and attitude: appeared as stated age, cooperative and attentive, casually dressed with good hygiene  Eye contact: good  Motor Function: within normal limits, No PMA/PMR  Gait/station: Not observed  Speech: normal for rate, rhythm, volume, latency, amount  Language: No overt abnormality  Mood/affect: euthymic / Affect was euthymic, reactive, in full range, normal intensity and mood congruent  Thought Processes: sequential and goal-directed  Thought content: denied suicidal ideations or homicidal ideations, no overt delusions elicited  Associations: intact associations  Perceptual disturbances: denies Auditory/Visual/Tactile Hallucinations  Orientation: oriented to time, person, place and to the situational context  Cognitive Function: intact  Memory: intact short-term and long-term  Intellect: average  Fund of knowledge: aware of current events, aware of past history and vocabulary average  Impulse control: good  Insight/judgment: good/good    Pain: reported having pain in his back  Pain scale: 7    Laboratory Results: I have personally reviewed all pertinent laboratory/tests results    Recent Labs (last 2 months):   Appointment on 05/06/2021   Component Date Value    WBC 05/06/2021 5 42     RBC 05/06/2021 4 91     Hemoglobin 05/06/2021 15 3     Hematocrit 05/06/2021 45 1     MCV 05/06/2021 92     MCH 05/06/2021 31 2     MCHC 05/06/2021 33 9     RDW 05/06/2021 11 9     Platelets 20/03/0187 192     MPV 05/06/2021 10 8     Sodium 05/06/2021 140     Potassium 05/06/2021 4 1     Chloride 05/06/2021 107     CO2 05/06/2021 30     ANION GAP 05/06/2021 3*    BUN 05/06/2021 18     Creatinine 05/06/2021 0 94     Glucose, Fasting 05/06/2021 93     Calcium 05/06/2021 9 0     AST 05/06/2021 15     ALT 05/06/2021 26     Alkaline Phosphatase 05/06/2021 56     Total Protein 05/06/2021 6 7     Albumin 05/06/2021 4 0     Total Bilirubin 05/06/2021 0 62     eGFR 05/06/2021 102     Cholesterol 05/06/2021 169     Triglycerides 05/06/2021 103     HDL, Direct 05/06/2021 46     LDL Calculated 05/06/2021 102*    Non-HDL-Chol (CHOL-HDL) 05/06/2021 123     PSA 05/06/2021 2 8     HIV-1/HIV-2 Ab 05/06/2021 Non-Reactive     Hepatitis B Surface Ag 05/06/2021 Non-reactive     Hepatitis C Ab 05/06/2021 Non-reactive     Hep B C IgM 05/06/2021 Non-reactive     Hep B Core Total Ab 05/06/2021 Non-reactive     N gonorrhoeae, DNA Probe 05/06/2021 Negative     Chlamydia trachomatis, D* 05/06/2021 Negative     RPR 05/06/2021 Non-Reactive     TSH 3RD GENERATON 05/06/2021 0 513    Admission on 04/06/2021, Discharged on 04/06/2021   Component Date Value    Color, UA 04/06/2021 yellow     Clarity, UA 04/06/2021 clear     N gonorrhoeae, DNA Probe 04/06/2021 Negative     Chlamydia trachomatis, D* 04/06/2021 Negative     Color, UA 04/06/2021 Yellow     Clarity, UA 04/06/2021 Clear     pH, UA 04/06/2021 7 0     Leukocytes, UA 04/06/2021 Negative     Nitrite, UA 04/06/2021 Negative     Protein, UA 04/06/2021 Negative     Glucose, UA 04/06/2021 Negative     Ketones, UA 04/06/2021 Negative     Urobilinogen, UA 04/06/2021 0 2     Bilirubin, UA 04/06/2021 Negative     Blood, UA 04/06/2021 Trace*    Specific [place], UA 04/06/2021 1 025     RBC, UA 04/06/2021 2-4     WBC, UA 04/06/2021 None Seen     Epithelial Cells 04/06/2021 None Seen     Bacteria, UA 04/06/2021 None Seen     Hyaline Casts, UA 04/06/2021 None Seen          Assessment/Plan:   P 78 y o   male,  (10 y/o daughter in joint custody and 24 y/o son on [place] living w/ his mother), domiciled alone and 10 days/month w/ her daughter, 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, w/ PMH of BPH, erectile dysfunction, h/o fasciotomy, joint stiffness of LLE and PPH of ADHD and MDD, no prior psychiatric admissions, no prior SA, no h/o self-injurious behavior, on Prozac 80 mg, Vyvanse 60 mg and Adderall 30 mg who presented to the mental health clinic for the initial intake and psychiatric evaluation on 1/19/2021  Presented w/ ADHD sxs well controlled with current regimen over past few years, and h/o MDD in remission  Denied SI/HI, intent or plan upon direct inquiry at this time  PHQ-9: 4; SUKH-7: 4  His current presentation meets criteria for ADHD and MDD, in remission  Maintained on the same medication regimen, and started on Trazodone PRN for sleep  Referred for individual therapy   The patient presented w/ stable mood and ADHD sxs, and c/o sexual side effects w/ Prozac (was started on Wellbutrin  mg po daily to counteract sexual side effects which reportedly was not helpful and discontinued), Vyvanse maintained the same dose and Adderall tapered down from 30 mg to 20 mg  The patient will start therapy after moving to [place], and will follow up w/ outpatient care with the provider there  So the case will be closed as the patient is moving out of state  Diagnoses and all orders for this visit:    Attention deficit hyperactivity disorder (ADHD), predominantly inattentive type    Major depressive disorder, remission status unspecified, unspecified whether recurrent          Impression:  1  Attention deficit hyperactivity disorder (ADHD), predominantly inattentive type     2   Major depressive disorder, remission status unspecified, unspecified whether recurrent         Treatment Recommendations/Precautions:  - Continue Vyvanse 60 mg for ADHD  - Continue Adderall 20 mg po daily in the afternoon for ADHD; to be tapered off as tolerated  - Continue Prozac 80 mg for depression  - Continue Trazodone 50 mg po nightly for insomnia    - Medications sent to patient's pharmacy for 90 day supply  - Pending therapy after moving to [facility]  - Psychoeducation provided to the patient and benefits, potential risks and side effects discussed; importance of compliance with the psychiatric treatment reiterated, and the patient verbalized understanding of the matter     - Follow up with the provider in [facility]  - Educated about healthy life style, risk of falls/sedation and addiction  Patient was receptive to education   - The patient was educated about 24 hour and weekend coverage for urgent situations accessed by calling [facility] [address] main practice number  - Patient was educated to call [address] [facility] ([phone] [[phone]]) for behavioral crisis at anytime or 911 for any safety concerns, or go to nearest ER if his symptoms become overwhelming or unmanageable  Current Outpatient Medications   Medication Sig Dispense Refill    amphetamine-dextroamphetamine (ADDERALL) 20 mg tablet Take 1 tablet (20 mg total) by mouth dailyMax Daily Amount: 20 mg 30 tablet 0    FLUoxetine (PROzac) 40 MG capsule Take 2 capsules (80 mg total) by mouth daily 60 capsule 1    lisdexamfetamine (VYVANSE) 60 MG capsule Take 1 capsule (60 mg total) by mouth every morningMax Daily Amount: 60 mg 30 capsule 0    methocarbamol (ROBAXIN) 500 mg tablet Take 1 tablet (500 mg total) by mouth daily at bedtime as needed for muscle spasms 30 tablet 0    tadalafil (CIALIS) 20 MG tablet Take 1 tablet (20 mg total) by mouth daily as needed for erectile dysfunction 30 tablet 3    traZODone (DESYREL) 50 mg tablet Take 1 tablet (50 mg total) by mouth daily at bedtime 30 tablet 1     No current facility-administered medications for this visit            Medications Risks/Benefits      Risks, Benefits And Possible Side Effects Of Medications:    Risks, benefits, and possible side effects of medications explained to Herbert and he verbalizes understanding and agreement for treatment  Controlled Medication Discussion:     Brisa Silverio has been filling controlled prescriptions on time as prescribed according to [address] Monitoring Program    Psychotherapy Provided:     Individual psychotherapy provided: Yes  Counseling was provided during the session today for 16 minutes  Psychoeducation provided to the patient and was educated about the importance of compliance with the medications and psychiatric treatment  Supportive psychotherapy provided to the patient  Solution Focused Brief Therapy (SFBT) provided  Patient's emotions were validated and specific labeled praise provided  [place] suggestions were offered in a supportive non-critical way       Treatment Plan:    Completed and signed during the session: No - the patient is moving to [place] and will follow up with a new provider; case is being closed at this time    Hoda Rojo MD 05/26/21

## 2021-05-25 NOTE — LETTER
05/26/21       Meghana Jaimes   [address] [facility] [place] [address]       Dear Meghana Jaimes:    It has been our pleasure to serve your needs  Since are you no longer interested in continuing your treatment with Dr Harjinder Carson MD at [facility], your chart is being closed at this time  If you wish to return to [facility], you will need to have another initial assessment and intake appointment  Please call [phone] to schedule a new psychiatric intake if you are interested in doing so  Please follow-up with your primary care provider for continual care  When you have scheduled an appointment with another agency, please feel free to complete a release of information so that your records can be transferred to your new provider prior to your first appointment  This will aid with the continuity of your care  Sincerely,           Dr Harjinder Carson MD     [facility]        We will continue to provide psychotropic medications and/or emergency counseling as deemed appropriate by clinical staff for 45 days from receipt of this letter  For a referral to another agency, we would suggest that you contact your primary health care provider or insurance company  Please see Provider's List of agencies below:    Outpatient Mental Health Adult  [facility]  [address]   [phone]   Deepthi Teran to / Armin Myers 19 drive  [address] [facility] [address]   Keila Ramirez MD [facility]  [address] Su[address] MD, [address] [facility]  [address], Adolescents and Family  [place]: 100 [facility], [address] [place] [address] [phone]  CAGIMPKK EXHCMWIPCKKJ COPJ [address]  [address] [place], [address] [facility]  and [facility], [address], 200 [facility] (14 and over)  [address]  [employer]  [address]  [place], [address]  Alanna Phillips Amrit N[place] Speaking  [facility] [address]   [address], [facility] 54  [address]:   [facility], [address] Phone: [phone]  DTE [employer] [facility], 8000  [address] [place] New Admissions [phone] [facility]:   [address] [id number]:[id number] 29 [facility], [address] [place] [address] Phone: [phone] Outpatient: [address] [place],  [address] Phone: [address] "[employer]  [phone] / [address] [phone]  Drug & Alcohol Services:  [facility] & Alcohol:   [phone] or [address]  [facility] Drug & Alcohol:  [phone] or [address]:  [address] [place] [address] Street:  [address]  [place]: [phone]    [address]:   [address] Alcohol Commission:  [facility] AFFILIATED WITH [facility], [address]  Phone: [phone]:    [employer] [place] POINT:  [facility] [facility] [address]: [phone] or [address] / [place]: [address]: [phone]    [facility]: [facility] [address]: [phone] ([phone])    [place]: [phone]    National Suicide Prevention Hotline:  5-891.629.6535 Will Wray

## 2021-05-26 ENCOUNTER — TELEMEDICINE (OUTPATIENT)
Dept: PSYCHIATRY | Facility: CLINIC | Age: 36
End: 2021-05-26
Payer: COMMERCIAL

## 2021-05-26 ENCOUNTER — DOCUMENTATION (OUTPATIENT)
Dept: PSYCHIATRY | Facility: CLINIC | Age: 36
End: 2021-05-26

## 2021-05-26 DIAGNOSIS — F32.9 MAJOR DEPRESSIVE DISORDER, REMISSION STATUS UNSPECIFIED, UNSPECIFIED WHETHER RECURRENT: ICD-10-CM

## 2021-05-26 DIAGNOSIS — F90.0 ATTENTION DEFICIT HYPERACTIVITY DISORDER (ADHD), PREDOMINANTLY INATTENTIVE TYPE: Primary | ICD-10-CM

## 2021-05-26 PROCEDURE — 99213 OFFICE O/P EST LOW 20 MIN: CPT | Performed by: STUDENT IN AN ORGANIZED HEALTH CARE EDUCATION/TRAINING PROGRAM
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
## 2021-05-27 NOTE — TELEPHONE ENCOUNTER
DISCHARGE LETTER for Dr Ravi Flores MD (certified and regular) placed in outgoing mail on 5/27/2021      Article #:  [id number]      Address:  [place] [address]

## 2021-06-18 DIAGNOSIS — F32.9 MAJOR DEPRESSIVE DISORDER, REMISSION STATUS UNSPECIFIED, UNSPECIFIED WHETHER RECURRENT: ICD-10-CM

## 2021-06-18 DIAGNOSIS — F90.0 ATTENTION DEFICIT HYPERACTIVITY DISORDER (ADHD), PREDOMINANTLY INATTENTIVE TYPE: ICD-10-CM

## 2021-06-18 RX ORDER — FLUOXETINE HYDROCHLORIDE 40 MG/1
CAPSULE ORAL
Qty: 60 CAPSULE | Refills: 1 | Status: SHIPPED | OUTPATIENT
Start: 2021-06-18

## 2021-06-24 ENCOUNTER — TELEPHONE (OUTPATIENT)
Dept: PSYCHIATRY | Facility: CLINIC | Age: 36
End: 2021-06-24

## 2021-07-07 ENCOUNTER — TELEPHONE (OUTPATIENT)
Dept: PSYCHIATRY | Facility: CLINIC | Age: 36
End: 2021-07-07